# Patient Record
Sex: FEMALE | Race: WHITE | NOT HISPANIC OR LATINO | Employment: UNEMPLOYED | URBAN - METROPOLITAN AREA
[De-identification: names, ages, dates, MRNs, and addresses within clinical notes are randomized per-mention and may not be internally consistent; named-entity substitution may affect disease eponyms.]

---

## 2017-03-10 ENCOUNTER — OFFICE VISIT (OUTPATIENT)
Dept: MEDICAL GROUP | Facility: PHYSICIAN GROUP | Age: 27
End: 2017-03-10
Payer: OTHER GOVERNMENT

## 2017-03-10 VITALS
SYSTOLIC BLOOD PRESSURE: 128 MMHG | DIASTOLIC BLOOD PRESSURE: 80 MMHG | WEIGHT: 198 LBS | TEMPERATURE: 97.6 F | BODY MASS INDEX: 32.99 KG/M2 | HEART RATE: 74 BPM | HEIGHT: 65 IN | OXYGEN SATURATION: 97 %

## 2017-03-10 DIAGNOSIS — E66.9 OBESITY (BMI 30-39.9): ICD-10-CM

## 2017-03-10 DIAGNOSIS — Z01.419 WELL WOMAN EXAM: ICD-10-CM

## 2017-03-10 DIAGNOSIS — Z00.00 WELL ADULT HEALTH CHECK: ICD-10-CM

## 2017-03-10 PROCEDURE — 99395 PREV VISIT EST AGE 18-39: CPT | Performed by: FAMILY MEDICINE

## 2017-03-10 ASSESSMENT — PATIENT HEALTH QUESTIONNAIRE - PHQ9: CLINICAL INTERPRETATION OF PHQ2 SCORE: 0

## 2017-03-10 ASSESSMENT — PAIN SCALES - GENERAL: PAINLEVEL: NO PAIN

## 2017-03-11 NOTE — PROGRESS NOTES
"Subjective:   Diane Duke is a 26 y.o. female here today for annual physical exam    Obesity (BMI 30-39.9)  Ongoing issue. Patient reports that she has started a healthy eating program and is getting regular daily exercise.         Current medicines (including changes today)  Current Outpatient Prescriptions   Medication Sig Dispense Refill   • Prenatal [id number] (PRENATE AM) 1 MG Tab Take 1 Tab by mouth every day. 90 Tab 3   • albuterol 108 (90 BASE) MCG/ACT Aero Soln inhalation aerosol Inhale 2 Puffs by mouth 2 times a day as needed for Shortness of Breath. 2 puffs  BID PRN SOB 1 Inhaler 1     No current facility-administered medications for this visit.     She  has a past medical history of Hemorrhoids (2015); Bronchitis (2013); Hay fever (yearly); and Pneumonia. She also has no past medical history of Headache(784.0), Hypertension, or Diabetes (CMS-[facility]).    ROS   No chest pain, no shortness of breath, no abdominal pain       Objective:     Blood pressure 128/80, pulse 74, temperature 36.4 °C (97.6 °F), height 1.651 m (5' 5\"), weight 89.812 kg (198 lb), SpO2 97 %, not currently breastfeeding. Body mass index is 32.95 kg/(m^2).   Physical Exam:  Alert, oriented in no acute distress.  Eye contact is good, speech goal directed, affect calm  HEENT: conjunctiva non-injected, sclera non-icteric.  Pinna normal. TM pearly gray.   Oral mucous membranes pink and moist with no lesions.  Neck No adenopathy or masses in the neck or supraclavicular regions.  Lungs: clear to auscultation bilaterally with good excursion.  CV: regular rate and rhythm.  Abdomen: soft, nontender, No CVAT  Ext: no edema, color normal, vascularity normal, temperature normal  Neuro: CN 2-12 grossly intact      Assessment and Plan:   The following treatment plan was discussed     1. Well adult health check  CBC WITH DIFFERENTIAL    COMP METABOLIC PANEL    LIPID PROFILE    VITAMIN D,25 HYDROXY    No acute findings on exam; charges been " updated. Patient will be sent for age appropriate screening labs; monitor for results   2. Well woman exam  REFERRAL TO OB/GYN    Stable. Patient needs referral due to insurance.   3. Obesity (BMI 30-39.9)  Patient identified as having weight management issue.  Appropriate orders and counseling given.    Stable. Continue healthy lifestyle. Monitor       Followup: Return in about 1 year (around 3/10/2018) for annual physical exam, Short.

## 2017-03-11 NOTE — ASSESSMENT & PLAN NOTE
Ongoing issue. Patient reports that she has started a healthy eating program and is getting regular daily exercise.

## 2017-03-13 ENCOUNTER — HOSPITAL ENCOUNTER (OUTPATIENT)
Dept: LAB | Facility: MEDICAL CENTER | Age: 27
End: 2017-03-13
Attending: FAMILY MEDICINE
Payer: OTHER GOVERNMENT

## 2017-03-13 DIAGNOSIS — Z00.00 WELL ADULT HEALTH CHECK: ICD-10-CM

## 2017-03-13 LAB
25(OH)D3 SERPL-MCNC: 21 NG/ML (ref 30–100)
ALBUMIN SERPL BCP-MCNC: 4.1 G/DL (ref 3.2–4.9)
ALBUMIN/GLOB SERPL: 1.5 G/DL
ALP SERPL-CCNC: 53 U/L (ref 30–99)
ALT SERPL-CCNC: 18 U/L (ref 2–50)
ANION GAP SERPL CALC-SCNC: 5 MMOL/L (ref 0–11.9)
AST SERPL-CCNC: 16 U/L (ref 12–45)
BASOPHILS # BLD AUTO: 0.04 K/UL (ref 0–0.12)
BASOPHILS NFR BLD AUTO: 0.5 % (ref 0–1.8)
BILIRUB SERPL-MCNC: 0.5 MG/DL (ref 0.1–1.5)
BUN SERPL-MCNC: 11 MG/DL (ref 8–22)
CALCIUM SERPL-MCNC: 9.1 MG/DL (ref 8.5–10.5)
CHLORIDE SERPL-SCNC: 108 MMOL/L (ref 96–112)
CHOLEST SERPL-MCNC: 141 MG/DL (ref 100–199)
CO2 SERPL-SCNC: 25 MMOL/L (ref 20–33)
CREAT SERPL-MCNC: 0.57 MG/DL (ref 0.5–1.4)
EOSINOPHIL # BLD: 0.27 K/UL (ref 0–0.51)
EOSINOPHIL NFR BLD AUTO: 3.1 % (ref 0–6.9)
ERYTHROCYTE [DISTWIDTH] IN BLOOD BY AUTOMATED COUNT: 46 FL (ref 35.9–50)
GLOBULIN SER CALC-MCNC: 2.7 G/DL (ref 1.9–3.5)
GLUCOSE SERPL-MCNC: 97 MG/DL (ref 65–99)
HCT VFR BLD AUTO: 44.1 % (ref 37–47)
HDLC SERPL-MCNC: 44 MG/DL
HGB BLD-MCNC: 14.8 G/DL (ref 12–16)
IMM GRANULOCYTES # BLD AUTO: 0.02 K/UL (ref 0–0.11)
IMM GRANULOCYTES NFR BLD AUTO: 0.2 % (ref 0–0.9)
LDLC SERPL CALC-MCNC: 83 MG/DL
LYMPHOCYTES # BLD: 2.77 K/UL (ref 1–4.8)
LYMPHOCYTES NFR BLD AUTO: 32 % (ref 22–41)
MCH RBC QN AUTO: 29.3 PG (ref 27–33)
MCHC RBC AUTO-ENTMCNC: 33.6 G/DL (ref 33.6–35)
MCV RBC AUTO: 87.3 FL (ref 81.4–97.8)
MONOCYTES # BLD: 0.47 K/UL (ref 0–0.85)
MONOCYTES NFR BLD AUTO: 5.4 % (ref 0–13.4)
NEUTROPHILS # BLD: 5.08 K/UL (ref 2–7.15)
NEUTROPHILS NFR BLD AUTO: 58.8 % (ref 44–72)
NRBC # BLD AUTO: 0 K/UL
NRBC BLD-RTO: 0 /100 WBC
PLATELET # BLD AUTO: 252 K/UL (ref 164–446)
PMV BLD AUTO: 10.6 FL (ref 9–12.9)
POTASSIUM SERPL-SCNC: 4.3 MMOL/L (ref 3.6–5.5)
PROT SERPL-MCNC: 6.8 G/DL (ref 6–8.2)
RBC # BLD AUTO: 5.05 M/UL (ref 4.2–5.4)
SODIUM SERPL-SCNC: 138 MMOL/L (ref 135–145)
TRIGL SERPL-MCNC: 72 MG/DL (ref 0–149)
WBC # BLD AUTO: 8.7 K/UL (ref 4.8–10.8)

## 2017-03-13 PROCEDURE — 80061 LIPID PANEL: CPT

## 2017-03-13 PROCEDURE — 36415 COLL VENOUS BLD VENIPUNCTURE: CPT

## 2017-03-13 PROCEDURE — 85025 COMPLETE CBC W/AUTO DIFF WBC: CPT

## 2017-03-13 PROCEDURE — 80053 COMPREHEN METABOLIC PANEL: CPT

## 2017-03-13 PROCEDURE — 82306 VITAMIN D 25 HYDROXY: CPT

## 2017-03-14 ENCOUNTER — TELEPHONE (OUTPATIENT)
Dept: MEDICAL GROUP | Facility: PHYSICIAN GROUP | Age: 27
End: 2017-03-14

## 2017-03-14 NOTE — TELEPHONE ENCOUNTER
----- Message from Roxane Da Silva D.O. sent at 3/14/2017  7:28 AM PDT -----  Please advise pt that all labs are in a normal/acceptable range except: Vit D.  Recommend take over the counter vit D 2000 IU twice daily.    Roxane Da Silva D.O.

## 2017-03-14 NOTE — Clinical Note
March 14, 2017         Diane Duke  1986 [facility] [address]        Dear Diane:      Below are the results from your recent visit:    Resulted Orders   CBC WITH DIFFERENTIAL   Result Value Ref Range    WBC 8.7 4.8 - 10.8 K/uL    RBC 5.05 4.20 - 5.40 M/uL    Hemoglobin 14.8 12.0 - 16.0 g/dL    Hematocrit 44.1 37.0 - 47.0 %    MCV 87.3 81.4 - 97.8 fL    MCH 29.3 27.0 - 33.0 pg    MCHC 33.6 33.6 - 35.0 g/dL    RDW 46.0 35.9 - 50.0 fL    Platelet Count 252 164 - 446 K/uL    MPV 10.6 9.0 - 12.9 fL    Neutrophils-Polys 58.80 44.00 - 72.00 %    Lymphocytes 32.00 22.00 - 41.00 %    Monocytes 5.40 0.00 - 13.40 %    Eosinophils 3.10 0.00 - 6.90 %    Basophils 0.50 0.00 - 1.80 %    Immature Granulocytes 0.20 0.00 - 0.90 %    Nucleated RBC 0.00 /100 WBC    Neutrophils (Absolute) 5.08 2.00 - 7.15 K/uL      Comment:      Includes immature neutrophils, if present.    Lymphs (Absolute) 2.77 1.00 - 4.80 K/uL    Monos (Absolute) 0.47 0.00 - 0.85 K/uL    Eos (Absolute) 0.27 0.00 - 0.51 K/uL    Baso (Absolute) 0.04 0.00 - 0.12 K/uL    Immature Granulocytes (abs) 0.02 0.00 - 0.11 K/uL    NRBC (Absolute) 0.00 K/uL    Narrative    Request patient fasting?->Yes   COMP METABOLIC PANEL   Result Value Ref Range    Sodium 138 135 - 145 mmol/L    Potassium 4.3 3.6 - 5.5 mmol/L    Chloride 108 96 - 112 mmol/L    Co2 25 20 - 33 mmol/L    Anion Gap 5.0 0.0 - 11.9    Glucose 97 65 - 99 mg/dL    Bun 11 8 - 22 mg/dL    Creatinine 0.57 0.50 - 1.40 mg/dL    Calcium 9.1 8.5 - 10.5 mg/dL    AST(SGOT) 16 12 - 45 U/L    ALT(SGPT) 18 2 - 50 U/L    Alkaline Phosphatase 53 30 - 99 U/L    Total Bilirubin 0.5 0.1 - 1.5 mg/dL    Albumin 4.1 3.2 - 4.9 g/dL    Total Protein 6.8 6.0 - 8.2 g/dL    Globulin 2.7 1.9 - 3.5 g/dL    A-G Ratio 1.5 g/dL    Narrative    Request patient fasting?->Yes   LIPID PROFILE   Result Value Ref Range    Cholesterol,Tot 141 100 - 199 mg/dL    Triglycerides 72 0 - 149 mg/dL    HDL 44 >=40 mg/dL    LDL 83 <100 mg/dL     Narrative    Request patient fasting?->Yes   VITAMIN D,25 HYDROXY   Result Value Ref Range    25-Hydroxy   Vitamin D 25 21 (L) 30 - 100 ng/mL      Comment:      Adult Ranges:   <20 ng/mL - Deficiency  20-29 ng/mL - Insufficiency   ng/mL - Sufficiency  The [employer] Centaur Vitamin D Assay is standardized to the  [facility] reference measurement procedures, a  reference method for the Vitamin D Standardization Program  (VDSP).  The VDSP aligns patient results among 25 (OH)  Vitamin D methods.      Narrative    Request patient fasting?->Yes   ESTIMATED GFR   Result Value Ref Range    GFR If African American >60 >60 mL/min/1.73 m 2    GFR If Non African American >60 >60 mL/min/1.73 m 2    Narrative    Request patient fasting?->Yes     The test results show that all labs are in a normal/acceptable range except: Vit D.   Recommend take over the counter vit D 2000 IU twice daily. .    If you have any questions or concerns, please don't hesitate to call.        Sincerely,      Roxane Da Silva D.O.    Electronically Signed

## 2017-07-27 ENCOUNTER — TELEPHONE (OUTPATIENT)
Dept: OBGYN | Facility: CLINIC | Age: 27
End: 2017-07-27

## 2017-07-27 NOTE — TELEPHONE ENCOUNTER
Patient called is 10 wks pregnant and is have in light spotting. Wants to know is it is normal. States not cramping

## 2017-07-27 NOTE — TELEPHONE ENCOUNTER
Pt called stating she was having some pinkish spotting only one time when she wiped. States she had intercourse last night. No cramping or bright red bleeding. Patient has appointment for DUB in August. Explained to patient some spotting after intercourse may occur and can be normal. To make sure no bright red bleeding but if it does happen to call back. Pt had no further questions.

## 2017-07-28 ENCOUNTER — TELEPHONE (OUTPATIENT)
Dept: OBGYN | Facility: CLINIC | Age: 27
End: 2017-07-28

## 2017-07-28 ENCOUNTER — GYNECOLOGY VISIT (OUTPATIENT)
Dept: OBGYN | Facility: CLINIC | Age: 27
End: 2017-07-28
Payer: OTHER GOVERNMENT

## 2017-07-28 ENCOUNTER — APPOINTMENT (OUTPATIENT)
Dept: RADIOLOGY | Facility: MEDICAL CENTER | Age: 27
End: 2017-07-28
Attending: EMERGENCY MEDICINE
Payer: OTHER GOVERNMENT

## 2017-07-28 ENCOUNTER — HOSPITAL ENCOUNTER (EMERGENCY)
Facility: MEDICAL CENTER | Age: 27
End: 2017-07-28
Attending: EMERGENCY MEDICINE
Payer: OTHER GOVERNMENT

## 2017-07-28 VITALS
WEIGHT: 210.32 LBS | OXYGEN SATURATION: 98 % | SYSTOLIC BLOOD PRESSURE: 140 MMHG | TEMPERATURE: 98.4 F | DIASTOLIC BLOOD PRESSURE: 75 MMHG | BODY MASS INDEX: 33.01 KG/M2 | HEART RATE: 75 BPM | RESPIRATION RATE: 16 BRPM | HEIGHT: 67 IN

## 2017-07-28 VITALS
SYSTOLIC BLOOD PRESSURE: 116 MMHG | BODY MASS INDEX: 33.12 KG/M2 | HEIGHT: 67 IN | DIASTOLIC BLOOD PRESSURE: 72 MMHG | WEIGHT: 211 LBS

## 2017-07-28 DIAGNOSIS — O02.1 MISSED ABORTION: ICD-10-CM

## 2017-07-28 DIAGNOSIS — O03.9 MISCARRIAGE: ICD-10-CM

## 2017-07-28 LAB
ALBUMIN SERPL BCP-MCNC: 3.7 G/DL (ref 3.2–4.9)
ALBUMIN/GLOB SERPL: 1.2 G/DL
ALP SERPL-CCNC: 56 U/L (ref 30–99)
ALT SERPL-CCNC: 21 U/L (ref 2–50)
ANION GAP SERPL CALC-SCNC: 10 MMOL/L (ref 0–11.9)
APPEARANCE UR: CLEAR
AST SERPL-CCNC: 18 U/L (ref 12–45)
B-HCG SERPL-ACNC: 7623 MIU/ML (ref 0–10)
BACTERIA GENITAL QL WET PREP: NORMAL
BASOPHILS # BLD AUTO: 0.6 % (ref 0–1.8)
BASOPHILS # BLD: 0.04 K/UL (ref 0–0.12)
BILIRUB SERPL-MCNC: 0.9 MG/DL (ref 0.1–1.5)
BILIRUB UR QL STRIP.AUTO: NEGATIVE
BUN SERPL-MCNC: 5 MG/DL (ref 8–22)
C TRACH DNA SPEC QL NAA+PROBE: NEGATIVE
CALCIUM SERPL-MCNC: 9.1 MG/DL (ref 8.4–10.2)
CHLORIDE SERPL-SCNC: 103 MMOL/L (ref 96–112)
CO2 SERPL-SCNC: 24 MMOL/L (ref 20–33)
COLOR UR: ABNORMAL
CREAT SERPL-MCNC: 0.69 MG/DL (ref 0.5–1.4)
CULTURE IF INDICATED INDCX: NO UA CULTURE
EOSINOPHIL # BLD AUTO: 0.18 K/UL (ref 0–0.51)
EOSINOPHIL NFR BLD: 2.5 % (ref 0–6.9)
EPI CELLS #/AREA URNS HPF: NORMAL /HPF
ERYTHROCYTE [DISTWIDTH] IN BLOOD BY AUTOMATED COUNT: 44.3 FL (ref 35.9–50)
GFR SERPL CREATININE-BSD FRML MDRD: >60 ML/MIN/1.73 M 2
GLOBULIN SER CALC-MCNC: 3.2 G/DL (ref 1.9–3.5)
GLUCOSE SERPL-MCNC: 95 MG/DL (ref 65–99)
GLUCOSE UR STRIP.AUTO-MCNC: NEGATIVE MG/DL
HCT VFR BLD AUTO: 45 % (ref 37–47)
HGB BLD-MCNC: 15.8 G/DL (ref 12–16)
IMM GRANULOCYTES # BLD AUTO: 0.02 K/UL (ref 0–0.11)
IMM GRANULOCYTES NFR BLD AUTO: 0.3 % (ref 0–0.9)
KETONES UR STRIP.AUTO-MCNC: NEGATIVE MG/DL
LEUKOCYTE ESTERASE UR QL STRIP.AUTO: NEGATIVE
LIPASE SERPL-CCNC: 22 U/L (ref 7–58)
LYMPHOCYTES # BLD AUTO: 2.94 K/UL (ref 1–4.8)
LYMPHOCYTES NFR BLD: 41.5 % (ref 22–41)
MCH RBC QN AUTO: 30.4 PG (ref 27–33)
MCHC RBC AUTO-ENTMCNC: 35.1 G/DL (ref 33.6–35)
MCV RBC AUTO: 86.5 FL (ref 81.4–97.8)
MICRO URNS: ABNORMAL
MONOCYTES # BLD AUTO: 0.35 K/UL (ref 0–0.85)
MONOCYTES NFR BLD AUTO: 4.9 % (ref 0–13.4)
N GONORRHOEA DNA SPEC QL NAA+PROBE: NEGATIVE
NEUTROPHILS # BLD AUTO: 3.56 K/UL (ref 2–7.15)
NEUTROPHILS NFR BLD: 50.2 % (ref 44–72)
NITRITE UR QL STRIP.AUTO: NEGATIVE
NRBC # BLD AUTO: 0 K/UL
NRBC BLD AUTO-RTO: 0 /100 WBC
NUMBER OF RH DOSES IND 8505RD: NORMAL
PH UR STRIP.AUTO: 6 [PH]
PLATELET # BLD AUTO: 259 K/UL (ref 164–446)
PMV BLD AUTO: 9.5 FL (ref 9–12.9)
POTASSIUM SERPL-SCNC: 3.6 MMOL/L (ref 3.6–5.5)
PROT SERPL-MCNC: 6.9 G/DL (ref 6–8.2)
PROT UR QL STRIP: NEGATIVE MG/DL
RBC # BLD AUTO: 5.2 M/UL (ref 4.2–5.4)
RBC # URNS HPF: NORMAL /HPF
RBC UR QL AUTO: ABNORMAL
RH BLD: NORMAL
SIGNIFICANT IND 70042: NORMAL
SITE SITE: NORMAL
SODIUM SERPL-SCNC: 137 MMOL/L (ref 135–145)
SOURCE SOURCE: NORMAL
SP GR UR STRIP.AUTO: <=1.005
SPECIMEN SOURCE: NORMAL
WBC # BLD AUTO: 7.1 K/UL (ref 4.8–10.8)
WBC #/AREA URNS HPF: NORMAL /HPF

## 2017-07-28 PROCEDURE — 81001 URINALYSIS AUTO W/SCOPE: CPT

## 2017-07-28 PROCEDURE — 86901 BLOOD TYPING SEROLOGIC RH(D): CPT

## 2017-07-28 PROCEDURE — 85025 COMPLETE CBC W/AUTO DIFF WBC: CPT

## 2017-07-28 PROCEDURE — 84702 CHORIONIC GONADOTROPIN TEST: CPT

## 2017-07-28 PROCEDURE — 99284 EMERGENCY DEPT VISIT MOD MDM: CPT

## 2017-07-28 PROCEDURE — 36415 COLL VENOUS BLD VENIPUNCTURE: CPT

## 2017-07-28 PROCEDURE — 76817 TRANSVAGINAL US OBSTETRIC: CPT | Performed by: OBSTETRICS & GYNECOLOGY

## 2017-07-28 PROCEDURE — 700105 HCHG RX REV CODE 258: Performed by: EMERGENCY MEDICINE

## 2017-07-28 PROCEDURE — 80053 COMPREHEN METABOLIC PANEL: CPT

## 2017-07-28 PROCEDURE — 99213 OFFICE O/P EST LOW 20 MIN: CPT | Performed by: OBSTETRICS & GYNECOLOGY

## 2017-07-28 PROCEDURE — 87591 N.GONORRHOEAE DNA AMP PROB: CPT

## 2017-07-28 PROCEDURE — 87491 CHLMYD TRACH DNA AMP PROBE: CPT

## 2017-07-28 PROCEDURE — 76817 TRANSVAGINAL US OBSTETRIC: CPT

## 2017-07-28 PROCEDURE — 83690 ASSAY OF LIPASE: CPT

## 2017-07-28 RX ORDER — ONDANSETRON 4 MG/1
4 TABLET, ORALLY DISINTEGRATING ORAL EVERY 8 HOURS PRN
Qty: 12 TAB | Refills: 0 | Status: SHIPPED | OUTPATIENT
Start: 2017-07-28 | End: 2017-07-28

## 2017-07-28 RX ORDER — ONDANSETRON 4 MG/1
4 TABLET, ORALLY DISINTEGRATING ORAL EVERY 8 HOURS PRN
Qty: 12 TAB | Refills: 0 | Status: SHIPPED | OUTPATIENT
Start: 2017-07-28 | End: 2017-09-20

## 2017-07-28 RX ORDER — IBUPROFEN 600 MG/1
600 TABLET ORAL
Qty: 20 TAB | Refills: 0 | Status: SHIPPED | OUTPATIENT
Start: 2017-07-28 | End: 2017-09-20

## 2017-07-28 RX ORDER — SODIUM CHLORIDE 9 MG/ML
1000 INJECTION, SOLUTION INTRAVENOUS ONCE
Status: COMPLETED | OUTPATIENT
Start: 2017-07-28 | End: 2017-07-28

## 2017-07-28 RX ADMIN — SODIUM CHLORIDE 1000 ML: 9 INJECTION, SOLUTION INTRAVENOUS at 06:20

## 2017-07-28 NOTE — DISCHARGE INSTRUCTIONS
"Miscarriage    Touch base later today with Dr Matthew and let them know what is going on.  I want you seen this upcoming week.  Return to [facility] for heavy bleeding/symptoms of blood loss, increasing pain, fever, or any turn for the worse.  Ibuprofen will help with cramping, may use Zofran for nausea.    A miscarriage is the sudden loss of an unborn baby (fetus) before the 20th week of pregnancy. Most miscarriages happen in the first 3 months of pregnancy. Sometimes, it happens before a woman even knows she is pregnant. A miscarriage is also called a \"spontaneous miscarriage\" or \"early pregnancy loss.\" Having a miscarriage can be an emotional experience. Talk with your caregiver about any questions you may have about miscarrying, the grieving process, and your future pregnancy plans.  CAUSES   · Problems with the fetal chromosomes that make it impossible for the baby to develop normally. Problems with the baby's genes or chromosomes are most often the result of errors that occur, by chance, as the embryo divides and grows. The problems are not inherited from the parents.  · Infection of the cervix or uterus.    · Hormone problems.    · Problems with the cervix, such as having an incompetent cervix. This is when the tissue in the cervix is not strong enough to hold the pregnancy.    · Problems with the uterus, such as an abnormally shaped uterus, uterine fibroids, or congenital abnormalities.    · Certain medical conditions.    · Smoking, drinking alcohol, or taking illegal drugs.    · Trauma.    Often, the cause of a miscarriage is unknown.   SYMPTOMS   · Vaginal bleeding or spotting, with or without cramps or pain.  · Pain or cramping in the abdomen or lower back.  · Passing fluid, tissue, or blood clots from the vagina.  DIAGNOSIS   Your caregiver will perform a physical exam. You may also have an ultrasound to confirm the miscarriage. Blood or urine tests may also be ordered.  TREATMENT   · Sometimes, treatment is " not necessary if you naturally pass all the fetal tissue that was in the uterus. If some of the fetus or placenta remains in the body (incomplete miscarriage), tissue left behind may become infected and must be removed. Usually, a dilation and curettage (D and C) procedure is performed. During a D and C procedure, the cervix is widened (dilated) and any remaining fetal or placental tissue is gently removed from the uterus.  · Antibiotic medicines are prescribed if there is an infection. Other medicines may be given to reduce the size of the uterus (contract) if there is a lot of bleeding.  · If you have Rh negative blood and your baby was Rh positive, you will need a Rh immunoglobulin shot. This shot will protect any future baby from having Rh blood problems in future pregnancies.  HOME CARE INSTRUCTIONS   · Your caregiver may order bed rest or may allow you to continue light activity. Resume activity as directed by your caregiver.  · Have someone help with home and family responsibilities during this time.    · Keep track of the number of sanitary pads you use each day and how soaked (saturated) they are. Write down this information.    · Do not use tampons. Do not douche or have sexual intercourse until approved by your caregiver.    · Only take over-the-counter or prescription medicines for pain or discomfort as directed by your caregiver.    · Do not take aspirin. Aspirin can cause bleeding.    · Keep all follow-up appointments with your caregiver.    · If you or your partner have problems with grieving, talk to your caregiver or seek counseling to help cope with the pregnancy loss. Allow enough time to grieve before trying to get pregnant again.    SEEK IMMEDIATE MEDICAL CARE IF:   · You have severe cramps or pain in your back or abdomen.  · You have a fever.  · You pass large blood clots (walnut-sized or larger) or tissue from your vagina. Save any tissue for your caregiver to inspect.    · Your bleeding  increases.    · You have a thick, bad-smelling vaginal discharge.  · You become lightheaded, weak, or you faint.    · You have chills.    MAKE SURE YOU:  · Understand these instructions.  · Will watch your condition.  · Will get help right away if you are not doing well or get worse.     This information is not intended to replace advice given to you by your health care provider. Make sure you discuss any questions you have with your health care provider.     Document Released: 06/13/2002 Document Revised: 04/14/2014 Document Reviewed: 02/05/2013  [employer] Interactive Patient Education ©2016 [employer]

## 2017-07-28 NOTE — TELEPHONE ENCOUNTER
Patient left voice mail stating that she went to urgent care and she had a miss carriage and needs to get in. Please call patient

## 2017-07-28 NOTE — ED PROVIDER NOTES
ED Provider Note    CHIEF COMPLAINT  Chief Complaint   Patient presents with   • Vaginal Bleeding     started bleeding yesterday at noon; per pt 10 weeks pregnant-used one pad this a.m.   • Abdominal Cramping       HPI  Diane Duke is a 27 y.o. female who presents complaining of abdominal pain and vaginal bleeding. The patient is a  at 10 weeks by dates. Yesterday she started having some spotting. This morning she woke sleep with heavier blood and sensation similar to when she starting her menstrual cycle. She has some cramping in the lower abdomen. It feels like menstrual cramps. Nothing makes it better, nothing makes it worse. Does not radiate. She otherwise has felt fine. She's had no nausea or vomiting. She's had no change in bowel or bladder. No discharge previously. No fever or chills. There is no other complaint. She called her gynecologist, Dr. Matthew, but was unable to be seen for 3-1/2 weeks, therefore presents here for guidance.    PAST MEDICAL HISTORY  Past Medical History   Diagnosis Date   • Hemorrhoids    • Bronchitis    • Hay fever yearly   • Pneumonia        FAMILY HISTORY  Family History   Problem Relation Age of Onset   • Hypertension Mother    • Diabetes Mother      type 2   • Other Paternal Uncle      obesity   • Hypertension Maternal Grandmother    • Depression Maternal Grandmother    • Diabetes Maternal Grandmother    • Cancer Maternal Grandfather      colon cancer & eye cancer   • Asthma Paternal Grandmother    • Anemia Other    • Hyperlipidemia Father        SOCIAL HISTORY  Social History   Substance Use Topics   • Smoking status: Never Smoker    • Smokeless tobacco: Never Used   • Alcohol Use: No         SURGICAL HISTORY  Past Surgical History   Procedure Laterality Date   • Appendectomy     • Primary c section         CURRENT MEDICATIONS  Home Medications     Reviewed by Kenia Tierney R.N. (Registered Nurse) on 17 at 0543  Med List Status: Complete     "Medication Last Dose Status    Prenatal [id number] (PRENATE AM) 1 MG Tab 7/27/2017 Active                I have reviewed the nurses notes and/or the list brought with the patient.    ALLERGIES  Allergies   Allergen Reactions   • Sulfa Drugs        REVIEW OF SYSTEMS  See HPI for further details. Review of systems as above, otherwise all other systems are negative.     PHYSICAL EXAM  VITAL SIGNS: /76 mmHg  Pulse 72  Temp(Src) 36.9 °C (98.4 °F)  Resp 16  Ht 1.702 m (5' 7.01\")  Wt 95.4 kg (210 lb 5.1 oz)  BMI 32.93 kg/m2  SpO2 98%  LMP 05/19/2017  Constitutional: Well appearing patient in no acute distress.  Not toxic, nor ill in appearance.  HENT: Mucus membranes moist.  Oropharynx is clear.  Eyes: Pupils equally round.  No scleral icterus.   Neck: Full nontender range of motion.  Cardiovascular: Regular heart rate and rhythm.  No murmurs, rubs, nor gallop appreciated.   Thorax & Lungs: Chest is nontender.  Lungs are clear to auscultation with good air movement bilaterally.  No wheeze, rhonchi, nor rales.   Abdomen: Soft, with no tenderness, rebound nor guarding.  No mass, pulsatile mass, nor hepatosplenomegaly appreciated.  Pelvic: Nurse chaperone. Normal external genitalia. There is some blood in the vault. The cervix is closed, nontender. There is no adnexal mass or tenderness appreciated.  Skin: No purpura nor petechia noted.  Extremities/Musculoskeletal: No sign of trauma.    Neurologic: Alert & oriented.  Strength and sensation is intact all around.  Gait is normal.  Psychiatric: Normal affect appropriate for the clinical situation.      LABS  Labs Reviewed   CBC WITH DIFFERENTIAL - Abnormal; Notable for the following:     MCHC 35.1 (*)     Lymphocytes 41.50 (*)     All other components within normal limits   URINALYSIS,CULTURE IF INDICATED - Abnormal; Notable for the following:     Occult Blood Small (*)     All other components within normal limits   HCG QUANTITATIVE - Abnormal; Notable " for the following:     Bhcg 7623.0 (*)     All other components within normal limits   COMP METABOLIC PANEL - Abnormal; Notable for the following:     Bun 5 (*)     All other components within normal limits   RH TYPE FOR RHOGAM FROM E.D.    Narrative:     Print Consent?->No   LIPASE   WET PREP   CHLAMYDIA/GC PCR URINE OR SWAB   ESTIMATED GFR   URINE MICROSCOPIC (W/UA)         RADIOLOGY/PROCEDURES  I have reviewed the patient's film interpretations myself, and they are read out by the radiologist as:   US-OB PELVIS TRANSVAGINAL   Final Result      Single intrauterine gestation is failed,  in progress, no cardiac activity        .    MEDICAL RECORD  I have reviewed patient's medical record and pertinent results are listed above.    COURSE & MEDICAL DECISION MAKING  I have reviewed any medical record information, laboratory studies and radiographic results as noted above.  This patient presents with vaginal bleeding setting of pregnancy. Her hCG is low relative to where it should be. Further, ultrasound measures about a 7 week fetus, abnormal compared to her dates, and there is no evidence of cardiac activity with what appears to be a  in progress. At this point, the patient is hemodynamically normal, is comfortable. I think that she is a candidate for conservative management. I did discuss the patient's case with Dr. Wilkes on call for her OB, Dr. Matthew, who agrees. I've asked her to touch base with their office later today when it opens, to let them know what is going on, as well as making sure she is seen early this upcoming week. We talked about specific return precautions, such as increased bleeding or symptoms of blood loss, pain not controlled by medicine, fever. She should return to the ER for any of these symptoms or any turn for the worse. We discussed return to [facility] as we have no gynecology on-call this hospital, and Dr. Matthew, I am told, does not have privileges here. I will write her for  ibuprofen for pain; understanding this might give her some increased chance of bleeding but I think will help quite a bit with the cramping discomfort. Also for Zofran should she need it. Instructions on miscarriage.    FINAL IMPRESSION  1. Miscarriage           This dictation was created using voice recognition software.    Electronically signed by: Danyel Johnson, 7/28/2017 6:05 AM

## 2017-07-28 NOTE — MR AVS SNAPSHOT
"        Diane Duke   2017 2:30 PM   Gynecology Visit   MRN: [id number]    Department:  [facility]   Dept Phone:  [phone]    Description:  Female : 1990   Provider:  Ezra Matthew M.D.           Reason for Visit     Other SAB      Allergies as of 2017     Allergen Noted Reactions    Sulfa Drugs 2015         You were diagnosed with     Missed    [632.ICD-9-CM]         Vital Signs     Blood Pressure Height Weight Body Mass Index Last Menstrual Period Breastfeeding?    116/72 mmHg 1.702 m (5' 7\") 95.709 kg (211 lb) 33.04 kg/m2 2017 Unknown    Smoking Status                   Never Smoker            Basic Information     Date Of Birth Sex Race Ethnicity Preferred Language    1990 Female White, White Non- English      Your appointments     Aug 22, 2017 11:00 AM   GYN Visit with Ezra Matthew M.D.   [facility] [address], [address]  [facility] [address] [phone]            Mar 09, 2018  4:00 PM   Established Patient with Roxane Da Silva D.O.   [facility]    [address] [place] [address] [phone]           You will be receiving a confirmation call a few days before your appointment from our automated call confirmation system.              Problem List              ICD-10-CM Priority Class Noted - Resolved    Irregular periods/menstrual cycles N92.6   3/4/2016 - Present    Encounter for preconception consultation Z31.69   2016 - Present    Obesity (BMI 30-39.9) E66.9   3/10/2017 - Present      Health Maintenance        Date Due Completion Dates    IMM DTaP/Tdap/Td Vaccine (1 - Tdap) 3/20/2008 ---    IMM INFLUENZA (1) 2017 ---    PAP SMEAR 2019, 2015            Current Immunizations     No immunizations on file.      Below and/or attached are the medications your provider expects you to take. Review all of your home medications and " newly ordered medications with your provider and/or pharmacist. Follow medication instructions as directed by your provider and/or pharmacist. Please keep your medication list with you and share with your provider. Update the information when medications are discontinued, doses are changed, or new medications (including over-the-counter products) are added; and carry medication information at all times in the event of emergency situations     Allergies:  SULFA DRUGS - (reactions not documented)               Medications  Valid as of: July 28, 2017 -  2:57 PM    Generic Name Brand Name Tablet Size Instructions for use    Ibuprofen (Tab) MOTRIN 600 MG Take 1 Tab by mouth 3 times a day, with meals. As needed for pain        Ondansetron (TABLET DISPERSIBLE) ZOFRAN ODT 4 MG Take 1 Tab by mouth every 8 hours as needed for Nausea/Vomiting.        Prenatal Hp-E4-[id number]-Igzcwt (Tab) PRENATE AM 1 MG Take 1 Tab by mouth every day.        .                 Medicines prescribed today were sent to:     [facility] [address] [place] [address] [id number] [place] [address] & [place] [id number] [facility] [address]    Phone: [phone] Fax: [phone]    Open 24 Hours?: No    [facility] - [address] [address]    Phone: [phone] Fax: [phone]    Open 24 Hours?: No      Medication refill instructions:       If your prescription bottle indicates you have medication refills left, it is not necessary to call your provider’s office. Please contact your pharmacy and they will refill your medication.    If your prescription bottle indicates you do not have any refills left, you may request refills at any time through one of the following ways: The online [employer] system (except Urgent Care), by calling your provider’s office, or by asking your pharmacy to contact your provider’s office with a refill request. Medication  refills are processed only during regular business hours and may not be available until the next business day. Your provider may request additional information or to have a follow-up visit with you prior to refilling your medication.   *Please Note: Medication refills are assigned a new Rx number when refilled electronically. Your pharmacy may indicate that no refills were authorized even though a new prescription for the same medication is available at the pharmacy. Please request the medicine by name with the pharmacy before contacting your provider for a refill.        Your To Do List     Future Labs/Procedures Complete By Expires    HCG QUANTITATIVE  As directed 7/28/2018    HCG QUANTITATIVE  As directed 7/28/2018         [employer] Access Code: Activation code not generated  Current seedtag Status: Active

## 2017-07-28 NOTE — ED NOTES
"Pt here with c/o    Chief Complaint   Patient presents with   • Vaginal Bleeding     started bleeding yesterday at noon; per pt 10 weeks pregnant-used one pad this a.m.   • Abdominal Cramping       /76 mmHg  Pulse 72  Temp(Src) 36.9 °C (98.4 °F)  Resp 16  Ht 1.702 m (5' 7.01\")  Wt 95.4 kg (210 lb 5.1 oz)  BMI 32.93 kg/m2  SpO2 98%    "

## 2017-07-28 NOTE — ED AVS SNAPSHOT
7/28/2017    Diane Duke  [address]  [place] [address]    Dear Diane:    [facility] wants to ensure your discharge home is safe and you or your loved ones have had all of your questions answered regarding your care after you leave the hospital.    Below is a list of resources and contact information should you have any questions regarding your hospital stay, follow-up instructions, or active medical symptoms.    Questions or Concerns Regarding… Contact   Medical Questions Related to Your Discharge  (7 days a week, 8am-5pm) Contact a Nurse Care Coordinator   [phone]   Medical Questions Not Related to Your Discharge  (24 hours a day / 7 days a week)  Contact the Nurse Health Line   [phone]    Medications or Discharge Instructions Refer to your discharge packet   or contact your [facility] Primary Care Provider   [phone]   Follow-up Appointment(s) Schedule your appointment via Goojet   or contact Scheduling [phone]   Billing Review your statement via Goojet  or contact Billing [phone]   Medical Records Review your records via Goojet   or contact Medical Records [phone]     You may receive a telephone call within two days of discharge. This call is to make certain you understand your discharge instructions and have the opportunity to have any questions answered. You can also easily access your medical information, test results and upcoming appointments via the Goojet free online health management tool. You can learn more and sign up at [employer]okki/Goojet. For assistance setting up your Goojet account, please call [phone].    Once again, we want to ensure your discharge home is safe and that you have a clear understanding of any next steps in your care. If you have any questions or concerns, please do not hesitate to contact us, we are here for you. Thank you for choosing [facility] for your healthcare needs.    Sincerely,    Your [facility] Healthcare Team

## 2017-07-28 NOTE — ED AVS SNAPSHOT
Home Care Instructions                                                                                                                Diane Duke   MRN: [id number]    Department:  [facility], Emergency Dept   Date of Visit:  7/28/2017            [facility], Emergency Dept    [address]    [place] [address]    Phone:  [phone]      You were seen by     Danyel Johnson M.D.      Your Diagnosis Was     Miscarriage     O03.9       These are the medications you received during your hospitalization from 07/28/2017 0527 to 07/28/2017 0739     Date/Time Order Dose Route Action    07/28/2017 0620 NS infusion 1,000 mL 1,000 mL Intravenous New Bag      Follow-up Information     1. Follow up with Ezra Matthew M.D..    Specialty:  OB/Gyn    Contact information    [address]  [place] [address] [phone]        Medication Information     Review all of your home medications and newly ordered medications with your primary doctor and/or pharmacist as soon as possible. Follow medication instructions as directed by your doctor and/or pharmacist.     Please keep your complete medication list with you and share with your physician. Update the information when medications are discontinued, doses are changed, or new medications (including over-the-counter products) are added; and carry medication information at all times in the event of emergency situations.               Medication List      START taking these medications        Instructions    Morning Afternoon Evening Bedtime    ibuprofen 600 MG Tabs   Commonly known as:  MOTRIN        Take 1 Tab by mouth 3 times a day, with meals. As needed for pain   Dose:  600 mg                        ondansetron 4 MG Tbdp   Commonly known as:  ZOFRAN ODT        Take 1 Tab by mouth every 8 hours as needed for Nausea/Vomiting.   Dose:  4 mg                          ASK your doctor about these medications        "Instructions    Morning Afternoon Evening Bedtime    PRENATE AM 1 MG Tabs        Take 1 Tab by mouth every day.   Dose:  1 Tab                             Where to Get Your Medications      You can get these medications from any pharmacy     Bring a paper prescription for each of these medications    - ibuprofen 600 MG Tabs  - ondansetron 4 MG Tbdp            Procedures and tests performed during your visit     CBC WITH DIFFERENTIAL    CHLAMYDIA & GC BY PCR    COMP METABOLIC PANEL    ESTIMATED GFR    HCG QUANTITATIVE SERUM    LIPASE    RH TYPE FOR RHOGAM FROM E.D.    URINALYSIS CULTURE, IF INDICATED    URINE MICROSCOPIC (W/UA)    US-OB PELVIS TRANSVAGINAL    WET PREP        Discharge Instructions       Miscarriage    Touch base later today with Dr Matthew and let them know what is going on.  I want you seen this upcoming week.  Return to [facility] for heavy bleeding/symptoms of blood loss, increasing pain, fever, or any turn for the worse.  Ibuprofen will help with cramping, may use Zofran for nausea.    A miscarriage is the sudden loss of an unborn baby (fetus) before the 20th week of pregnancy. Most miscarriages happen in the first 3 months of pregnancy. Sometimes, it happens before a woman even knows she is pregnant. A miscarriage is also called a \"spontaneous miscarriage\" or \"early pregnancy loss.\" Having a miscarriage can be an emotional experience. Talk with your caregiver about any questions you may have about miscarrying, the grieving process, and your future pregnancy plans.  CAUSES   · Problems with the fetal chromosomes that make it impossible for the baby to develop normally. Problems with the baby's genes or chromosomes are most often the result of errors that occur, by chance, as the embryo divides and grows. The problems are not inherited from the parents.  · Infection of the cervix or uterus.    · Hormone problems.    · Problems with the cervix, such as having an incompetent cervix. This is when the tissue " in the cervix is not strong enough to hold the pregnancy.    · Problems with the uterus, such as an abnormally shaped uterus, uterine fibroids, or congenital abnormalities.    · Certain medical conditions.    · Smoking, drinking alcohol, or taking illegal drugs.    · Trauma.    Often, the cause of a miscarriage is unknown.   SYMPTOMS   · Vaginal bleeding or spotting, with or without cramps or pain.  · Pain or cramping in the abdomen or lower back.  · Passing fluid, tissue, or blood clots from the vagina.  DIAGNOSIS   Your caregiver will perform a physical exam. You may also have an ultrasound to confirm the miscarriage. Blood or urine tests may also be ordered.  TREATMENT   · Sometimes, treatment is not necessary if you naturally pass all the fetal tissue that was in the uterus. If some of the fetus or placenta remains in the body (incomplete miscarriage), tissue left behind may become infected and must be removed. Usually, a dilation and curettage (D and C) procedure is performed. During a D and C procedure, the cervix is widened (dilated) and any remaining fetal or placental tissue is gently removed from the uterus.  · Antibiotic medicines are prescribed if there is an infection. Other medicines may be given to reduce the size of the uterus (contract) if there is a lot of bleeding.  · If you have Rh negative blood and your baby was Rh positive, you will need a Rh immunoglobulin shot. This shot will protect any future baby from having Rh blood problems in future pregnancies.  HOME CARE INSTRUCTIONS   · Your caregiver may order bed rest or may allow you to continue light activity. Resume activity as directed by your caregiver.  · Have someone help with home and family responsibilities during this time.    · Keep track of the number of sanitary pads you use each day and how soaked (saturated) they are. Write down this information.    · Do not use tampons. Do not douche or have sexual intercourse until approved by  your caregiver.    · Only take over-the-counter or prescription medicines for pain or discomfort as directed by your caregiver.    · Do not take aspirin. Aspirin can cause bleeding.    · Keep all follow-up appointments with your caregiver.    · If you or your partner have problems with grieving, talk to your caregiver or seek counseling to help cope with the pregnancy loss. Allow enough time to grieve before trying to get pregnant again.    SEEK IMMEDIATE MEDICAL CARE IF:   · You have severe cramps or pain in your back or abdomen.  · You have a fever.  · You pass large blood clots (walnut-sized or larger) or tissue from your vagina. Save any tissue for your caregiver to inspect.    · Your bleeding increases.    · You have a thick, bad-smelling vaginal discharge.  · You become lightheaded, weak, or you faint.    · You have chills.    MAKE SURE YOU:  · Understand these instructions.  · Will watch your condition.  · Will get help right away if you are not doing well or get worse.     This information is not intended to replace advice given to you by your health care provider. Make sure you discuss any questions you have with your health care provider.     Document Released: 06/13/2002 Document Revised: 04/14/2014 Document Reviewed: 02/05/2013  Bucky [employer] Interactive Patient Education ©2016 [employer]            Patient Information     Patient Information    Following emergency treatment: all patient requiring follow-up care must return either to a private physician or a clinic if your condition worsens before you are able to obtain further medical attention, please return to the emergency room.     Billing Information    At [facility], we work to make the billing process streamlined for our patients.  Our Representatives are here to answer any questions you may have regarding your hospital bill.  If you have insurance coverage and have supplied your insurance information to us, we will submit a claim to your  insurer on your behalf.  Should you have any questions regarding your bill, we can be reached online or by phone as follows:  Online: You are able pay your bills online or live chat with our representatives about any billing questions you may have. We are here to help Monday - Friday from 8:00am to 7:30pm and 9:00am - 12:00pm on Saturdays.  Please visit [email or web address]  for more information.   Phone:  [phone] or [phone]    Please note that your emergency physician, surgeon, pathologist, radiologist, anesthesiologist, and other specialists are not employed by [facility] and will therefore bill separately for their services.  Please contact them directly for any questions concerning their bills at the numbers below:     Emergency Physician Services:  [phone]  [facility]:  [phone]  [facility]:  [phone]  [facility]:  [phone]    1. Your final bill may vary from the amount quoted upon discharge if all procedures are not complete at that time, or if your doctor has additional procedures of which we are not aware. You will receive an additional bill if you return to the Emergency Department at [facility] for suture removal regardless of the facility of which the sutures were placed.     2. Please arrange for settlement of this account at the emergency registration.    3. All self-pay accounts are due in full at the time of treatment.  If you are unable to meet this obligation then payment is expected within 4-5 days.     4. If you have had radiology studies (CT, X-ray, Ultrasound, MRI), you have received a preliminary result during your emergency department visit. Please contact the radiology department [phone] to receive a copy of your final result. Please discuss the Final result with your primary physician or with the follow up physician provided.     Crisis Hotline:  National Crisis Hotline:   3-767-ZVTFODW or 1-540.840.6400  [place] Crisis Hotline:    1-690.129.6029 or 971-880-2154         ED Discharge Follow Up Questions    1. In order to provide you with very good care, we would like to follow up with a phone call in the next few days.  May we have your permission to contact you?     YES /  NO    2. What is the best phone number to call you? (       )_____-__________    3. What is the best time to call you?      Morning  /  Afternoon  /  Evening                   Patient Signature:  ____________________________________________________________    Date:  ____________________________________________________________      Your appointments     Aug 22, 2017 11:00 AM   GYN Visit with Ezra Matthew M.D.   [facility] ([address], [address] [id number] [phone]            Mar 09, 2018  4:00 PM   Established Patient with Roxane Da Silva D.O.   [facility]    [address] [place] [id number] [phone]           You will be receiving a confirmation call a few days before your appointment from our automated call confirmation system.

## 2017-07-28 NOTE — PROGRESS NOTES
"Diane Duke,  27 y.o.  female presents today with a C/O of :oligomenorrhea. Pt   Patient's last menstrual period was 2017.     Patient last seen last year and discussed pregnancy , vitamins , etc. PAtient stopped OCP's and apparently had irregual;r cycle intervals and got prenant . Patient with vaginal spotting , mild cramps and seen at Urgent Care today      Subjective : Nausea/Vomiting: No:  Abdominal /pelvic cramping : Yes :   vaginal bleeding:Yes      Menstrual Flow : variable can be heavy   GYN ROS:  normal menses, no abnormal bleeding, pelvic pain or discharge, no breast pain or new or enlarging lumps on self exam      Past Medical History   Diagnosis Date   • Hemorrhoids    • Bronchitis    • Hay fever yearly   • Pneumonia      Social History     Social History   • Marital Status:      Spouse Name: N/A   • Number of Children: N/A   • Years of Education: N/A     Occupational History   • Not on file.     Social History Main Topics   • Smoking status: Never Smoker    • Smokeless tobacco: Never Used   • Alcohol Use: No   • Drug Use: No   • Sexual Activity:     Partners: Male     Other Topics Concern   • Not on file     Social History Narrative       Past Surgical History   Procedure Laterality Date   • Appendectomy     • Primary c section         Current Birth control:  none    OB History    Para Term  AB SAB TAB Ectopic Multiple Living   3 2        2      # Outcome Date GA Lbr Narayan/2nd Weight Sex Delivery Anes PTL Lv   3 Current            2 Para    3.43 kg (7 lb 9 oz) M CS-Classical  N Y   1 Para     M VAGINAL AVA  N Y              Allergy:      Sulfa drugs    Exam;    /72 mmHg  Ht 1.702 m (5' 7\")  Wt 95.709 kg (211 lb)  BMI 33.04 kg/m2  LMP 2017  Breastfeeding? Unknown  well-appearing, well-hydrated, well-nourished, overweight  normal;   PERRLA, EOMI, fundi grossly normal, no papilledema, no AV nicking, sclera clear  Clear  RRR No M  abdomen is " soft without significant tenderness, masses, organomegaly or guarding  External genitalia normal, Vagina normal without discharge, Urethra without abnormality or discharge, cervix normal in appearanceLab.    Recent Results (from the past 336 hour(s))   RH TYPE FOR RHOGAM FROM E.D.    Collection Time: 07/28/17  5:40 AM   Result Value Ref Range    Emergency Department Rh Typing POS     Number Of Rh Doses Indicated ZERO    CBC WITH DIFFERENTIAL    Collection Time: 07/28/17  5:44 AM   Result Value Ref Range    WBC 7.1 4.8 - 10.8 K/uL    RBC 5.20 4.20 - 5.40 M/uL    Hemoglobin 15.8 12.0 - 16.0 g/dL    Hematocrit 45.0 37.0 - 47.0 %    MCV 86.5 81.4 - 97.8 fL    MCH 30.4 27.0 - 33.0 pg    MCHC 35.1 (H) 33.6 - 35.0 g/dL    RDW 44.3 35.9 - 50.0 fL    Platelet Count 259 164 - 446 K/uL    MPV 9.5 9.0 - 12.9 fL    Neutrophils-Polys 50.20 44.00 - 72.00 %    Lymphocytes 41.50 (H) 22.00 - 41.00 %    Monocytes 4.90 0.00 - 13.40 %    Eosinophils 2.50 0.00 - 6.90 %    Basophils 0.60 0.00 - 1.80 %    Immature Granulocytes 0.30 0.00 - 0.90 %    Nucleated RBC 0.00 /100 WBC    Neutrophils (Absolute) 3.56 2.00 - 7.15 K/uL    Lymphs (Absolute) 2.94 1.00 - 4.80 K/uL    Monos (Absolute) 0.35 0.00 - 0.85 K/uL    Eos (Absolute) 0.18 0.00 - 0.51 K/uL    Baso (Absolute) 0.04 0.00 - 0.12 K/uL    Immature Granulocytes (abs) 0.02 0.00 - 0.11 K/uL    NRBC (Absolute) 0.00 K/uL   HCG QUANTITATIVE SERUM    Collection Time: 07/28/17  5:44 AM   Result Value Ref Range    Bhcg 7623.0 (H) 0.0 - 10.0 mIU/mL   COMP METABOLIC PANEL    Collection Time: 07/28/17  5:44 AM   Result Value Ref Range    Sodium 137 135 - 145 mmol/L    Potassium 3.6 3.6 - 5.5 mmol/L    Chloride 103 96 - 112 mmol/L    Co2 24 20 - 33 mmol/L    Anion Gap 10.0 0.0 - 11.9    Glucose 95 65 - 99 mg/dL    Bun 5 (L) 8 - 22 mg/dL    Creatinine 0.69 0.50 - 1.40 mg/dL    Calcium 9.1 8.4 - 10.2 mg/dL    AST(SGOT) 18 12 - 45 U/L    ALT(SGPT) 21 2 - 50 U/L    Alkaline Phosphatase 56 30 - 99 U/L     Total Bilirubin 0.9 0.1 - 1.5 mg/dL    Albumin 3.7 3.2 - 4.9 g/dL    Total Protein 6.9 6.0 - 8.2 g/dL    Globulin 3.2 1.9 - 3.5 g/dL    A-G Ratio 1.2 g/dL   LIPASE    Collection Time: 17  5:44 AM   Result Value Ref Range    Lipase 22 7 - 58 U/L   ESTIMATED GFR    Collection Time: 17  5:44 AM   Result Value Ref Range    GFR If African American >60 >60 mL/min/1.73 m 2    GFR If Non African American >60 >60 mL/min/1.73 m 2   URINALYSIS CULTURE, IF INDICATED    Collection Time: 17  6:05 AM   Result Value Ref Range    Micro Urine Req Microscopic     Color Straw     Character Clear     Specific Gravity <=1.005 <1.035    Ph 6.0 5.0-8.0    Glucose Negative Negative mg/dL    Ketones Negative Negative mg/dL    Protein Negative Negative mg/dL    Bilirubin Negative Negative    Nitrite Negative Negative    Leukocyte Esterase Negative Negative    Occult Blood Small (A) Negative    Culture Indicated No UA Culture   URINE MICROSCOPIC (W/UA)    Collection Time: 17  6:05 AM   Result Value Ref Range    WBC Rare /hpf    RBC 0-2 /hpf    Epithelial Cells Rare Few /hpf   WET PREP    Collection Time: 17  6:14 AM   Result Value Ref Range    Significant Indicator NEG     Source GEN     Site VAGINAL     Wet Prep For Parasites       No yeast.  No motile Trichomonas seen.  No clue cells seen.     CHLAMYDIA & GC BY PCR    Collection Time: 17  6:14 AM   Result Value Ref Range    Source Vaginal      Ultrasound :    Per my Read   Transvaginal      First trimester findings: Intrauterine gestational sac seen: yes  Gestational sac summary: yolk sac seen, no pole , asyymetric sac , EGA: 7 weeks + 0 days  Fetal cardiac activity: absent  Crown-rump length: not seen , amnion , no pole ,     Assessment:    missed/threatened   Extensive discussion regarding blighted ovum , vs missed  . Discuss normal progression and lost. Patient prepared for cramps , bleeding and lost     Plan:  4 weeks  Serial labs , to  assess prog and hcg levels

## 2017-07-28 NOTE — ED AVS SNAPSHOT
Oxitec Access Code: Activation code not generated  Current Oxitec Status: Active    Emergent Viewshart  A secure, online tool to manage your health information     [employer]’s Oxitec® is a secure, online tool that connects you to your personalized health information from the privacy of your home -- day or night - making it very easy for you to manage your healthcare. Once the activation process is completed, you can even access your medical information using the Oxitec socorro, which is available for free in the Apple Socorro store or Google Play store.     Oxitec provides the following levels of access (as shown below):   My Chart Features   [facility] Primary Care Doctor [facility]  Specialists [facility]  Urgent  Care Non-[facility]  Primary Care  Doctor   Email your healthcare team securely and privately 24/7 X X X X   Manage appointments: schedule your next appointment; view details of past/upcoming appointments X      Request prescription refills. X      View recent personal medical records, including lab and immunizations X X X X   View health record, including health history, allergies, medications X X X X   Read reports about your outpatient visits, procedures, consult and ER notes X X X X   See your discharge summary, which is a recap of your hospital and/or ER visit that includes your diagnosis, lab results, and care plan. X X       How to register for Oxitec:  1. Go to  [email or web address].  2. Click on the Sign Up Now box, which takes you to the New Member Sign Up page. You will need to provide the following information:  a. Enter your Oxitec Access Code exactly as it appears at the top of this page. (You will not need to use this code after you’ve completed the sign-up process. If you do not sign up before the expiration date, you must request a new code.)   b. Enter your date of birth.   c. Enter your home email address.   d. Click Submit, and follow the next screen’s instructions.  3. Create a Oxitec ID. This will  be your Revel Touch login ID and cannot be changed, so think of one that is secure and easy to remember.  4. Create a Revel Touch password. You can change your password at any time.  5. Enter your Password Reset Question and Answer. This can be used at a later time if you forget your password.   6. Enter your e-mail address. This allows you to receive e-mail notifications when new information is available in Revel Touch.  7. Click Sign Up. You can now view your health information.    For assistance activating your Revel Touch account, call [phone]

## 2017-07-29 ENCOUNTER — HOSPITAL ENCOUNTER (OUTPATIENT)
Dept: LAB | Facility: MEDICAL CENTER | Age: 27
End: 2017-07-29
Attending: OBSTETRICS & GYNECOLOGY
Payer: OTHER GOVERNMENT

## 2017-07-29 DIAGNOSIS — O02.1 MISSED ABORTION: ICD-10-CM

## 2017-07-29 LAB
B-HCG SERPL-ACNC: 3662.1 MIU/ML (ref 0–5)
PROGEST SERPL-MCNC: 1.23 NG/ML

## 2017-07-29 PROCEDURE — 36415 COLL VENOUS BLD VENIPUNCTURE: CPT

## 2017-07-29 PROCEDURE — 84702 CHORIONIC GONADOTROPIN TEST: CPT

## 2017-07-29 PROCEDURE — 84144 ASSAY OF PROGESTERONE: CPT

## 2017-07-31 ENCOUNTER — HOSPITAL ENCOUNTER (OUTPATIENT)
Dept: LAB | Facility: MEDICAL CENTER | Age: 27
End: 2017-07-31
Attending: OBSTETRICS & GYNECOLOGY
Payer: OTHER GOVERNMENT

## 2017-07-31 DIAGNOSIS — O02.1 MISSED ABORTION: ICD-10-CM

## 2017-07-31 LAB
B-HCG SERPL-ACNC: 1020.5 MIU/ML (ref 0–5)
PROGEST SERPL-MCNC: 0.51 NG/ML

## 2017-07-31 PROCEDURE — 36415 COLL VENOUS BLD VENIPUNCTURE: CPT

## 2017-07-31 PROCEDURE — 84144 ASSAY OF PROGESTERONE: CPT

## 2017-07-31 PROCEDURE — 84702 CHORIONIC GONADOTROPIN TEST: CPT

## 2017-08-22 ENCOUNTER — GYNECOLOGY VISIT (OUTPATIENT)
Dept: OBGYN | Facility: CLINIC | Age: 27
End: 2017-08-22
Payer: OTHER GOVERNMENT

## 2017-08-22 VITALS
HEIGHT: 67 IN | SYSTOLIC BLOOD PRESSURE: 124 MMHG | DIASTOLIC BLOOD PRESSURE: 76 MMHG | BODY MASS INDEX: 32.8 KG/M2 | WEIGHT: 209 LBS

## 2017-08-22 DIAGNOSIS — O03.9 COMPLETE ABORTION: ICD-10-CM

## 2017-08-22 PROCEDURE — 99213 OFFICE O/P EST LOW 20 MIN: CPT | Performed by: OBSTETRICS & GYNECOLOGY

## 2017-08-22 NOTE — MR AVS SNAPSHOT
"        Diane Duke   2017 11:00 AM   Gynecology Visit   MRN: [id number]    Department:  [facility]   Dept Phone:  [phone]    Description:  Female : 1990   Provider:  Ezra Matthew M.D.           Reason for Visit     Follow-Up           Allergies as of 2017     Allergen Noted Reactions    Sulfa Drugs 2015         Vital Signs     Blood Pressure Height Weight Body Mass Index Last Menstrual Period Breastfeeding?    124/76 mmHg 1.702 m (5' 7\") 94.802 kg (209 lb) 32.73 kg/m2 2017 No    Smoking Status                   Never Smoker            Basic Information     Date Of Birth Sex Race Ethnicity Preferred Language    1990 Female White, White Non- English      Your appointments     2017  2:15 PM   GYN Visit with Ezra Matthew M.D.   [facility] ([address], [address] [id number] [phone]            Mar 09, 2018  4:00 PM   Established Patient with Roxane Da Silva D.O.   [facility]    [address] [place] [id number] [phone]           You will be receiving a confirmation call a few days before your appointment from our automated call confirmation system.              Problem List              ICD-10-CM Priority Class Noted - Resolved    Irregular periods/menstrual cycles N92.6   3/4/2016 - Present    Encounter for preconception consultation Z31.69   2016 - Present    Obesity (BMI 30-39.9) E66.9   3/10/2017 - Present      Health Maintenance        Date Due Completion Dates    IMM DTaP/Tdap/Td Vaccine (1 - Tdap) 3/20/2008 ---    IMM INFLUENZA (1) 2017 ---    PAP SMEAR 2019, 2015            Current Immunizations     No immunizations on file.      Below and/or attached are the medications your provider expects you to take. Review all of your home medications and newly ordered medications with your provider and/or pharmacist. Follow " medication instructions as directed by your provider and/or pharmacist. Please keep your medication list with you and share with your provider. Update the information when medications are discontinued, doses are changed, or new medications (including over-the-counter products) are added; and carry medication information at all times in the event of emergency situations     Allergies:  SULFA DRUGS - (reactions not documented)               Medications  Valid as of: August 22, 2017 - 11:52 AM    Generic Name Brand Name Tablet Size Instructions for use    Ibuprofen (Tab) MOTRIN 600 MG Take 1 Tab by mouth 3 times a day, with meals. As needed for pain        Ondansetron (TABLET DISPERSIBLE) ZOFRAN ODT 4 MG Take 1 Tab by mouth every 8 hours as needed for Nausea/Vomiting.        Prenatal [id number]-Uuswef (Tab) PRENATE AM 1 MG Take 1 Tab by mouth every day.        .                 Medicines prescribed today were sent to:     [facility] [id number] [place] [address] [facility]    [address] [facility] [address]    Phone: [phone] Fax: [phone]    Open 24 Hours?: No    [facility] - [place], [place] - [address] [place] [address]    [address] [facility] [address]    Phone: [phone] Fax: [phone]    Open 24 Hours?: No      Medication refill instructions:       If your prescription bottle indicates you have medication refills left, it is not necessary to call your provider’s office. Please contact your pharmacy and they will refill your medication.    If your prescription bottle indicates you do not have any refills left, you may request refills at any time through one of the following ways: The online [employer] system (except Urgent Care), by calling your provider’s office, or by asking your pharmacy to contact your provider’s office with a refill request. Medication refills are processed only during regular business hours and may not be  available until the next business day. Your provider may request additional information or to have a follow-up visit with you prior to refilling your medication.   *Please Note: Medication refills are assigned a new Rx number when refilled electronically. Your pharmacy may indicate that no refills were authorized even though a new prescription for the same medication is available at the pharmacy. Please request the medicine by name with the pharmacy before contacting your provider for a refill.           [employer] Access Code: Activation code not generated  Current [employer] Status: Active

## 2017-08-22 NOTE — PROGRESS NOTES
" 27 y.o.  female previously seen for : Chief Complaint:  Missed      Specialty Problems     None      . Patient now here in follow up. PAtient last seen and dx of Missed aboriton ,made. PAtient advised on spontaneous passage of tissue , and in deed it occurred and patient has no further bleeding or pain     Patient's last menstrual period was 2017.      Subjective: Abdominal Pain: negative    Vaginal Bleedingnegative  Menstrual Cycle: normal: negative  Dysmenorrhea:positive:   Dyspareunia:negative  Urinary Symptoms: negative   Vaginal Discharge:{No          Current outpatient prescriptions:   •  ibuprofen (MOTRIN) 600 MG Tab, Take 1 Tab by mouth 3 times a day, with meals. As needed for pain, Disp: 20 Tab, Rfl: 0  •  ondansetron (ZOFRAN ODT) 4 MG TABLET DISPERSIBLE, Take 1 Tab by mouth every 8 hours as needed for Nausea/Vomiting., Disp: 12 Tab, Rfl: 0  •  Prenatal [id number]-Jhjrih (PRENATE AM) 1 MG Tab, Take 1 Tab by mouth every day., Disp: 90 Tab, Rfl: 3  ROS: no change in ROS since visit of : 2017.  :No results found for this or any previous visit (from the past 336 hour(s)).    Filed Vitals:    17 1120   BP: 124/76   Height: 1.702 m (5' 7\")   Weight: 94.802 kg (209 lb)     Past Medical History   Diagnosis Date   • Hemorrhoids    • Bronchitis    • Hay fever yearly   • Pneumonia      PGYN: SAB   Social History     Social History   • Marital Status:      Spouse Name: N/A   • Number of Children: N/A   • Years of Education: N/A     Occupational History   • Not on file.     Social History Main Topics   • Smoking status: Never Smoker    • Smokeless tobacco: Never Used   • Alcohol Use: No   • Drug Use: No   • Sexual Activity:     Partners: Male     Other Topics Concern   • Not on file     Social History Narrative     Family History   Problem Relation Age of Onset   • Hypertension Mother    • Diabetes Mother      type 2   • Other Paternal Uncle      obesity   • Hypertension " Maternal Grandmother    • Depression Maternal Grandmother    • Diabetes Maternal Grandmother    • Cancer Maternal Grandfather      colon cancer & eye cancer   • Asthma Paternal Grandmother    • Anemia Other    • Hyperlipidemia Father      Past Surgical History   Procedure Laterality Date   • Appendectomy     • Primary c section           Exam:   General : Awake, alert and oriented x 3  Abdominal : no masses, no organomegaly, nontender no rebound or guarding  Pelvic :  external genitalia normal, Bartholin's glands, urethra, Enoch's glands negative, vaginal mucosa normal;  Mobile , normal uterus   Pelvic Support system : normal    U/S : OB ULTRASOUND LIMITED SUMMARY  Per my Read   transvaginal  First trimester findings: Intrauterine gestational sac seen: no  Uterus normal size , endometrial stripe thicken , but no fluid , ovaries notr well seen , no cul de sac fluid   Ass: Complete             Desires pregnancy     Spent 15 minutes minutes with the patient ; Face to Face, with >50% of this time spent in counseling and coordination of care, surrounding the above mentioned issues as well as:  P. Recommend 2 cycles , prior to further pregnancy attempt   Prenatal vitamins   Condoms   RT 10/2017 to assess hormones before pregnancy     Follow up : Return visit in 2 month(s)

## 2017-09-20 ENCOUNTER — OFFICE VISIT (OUTPATIENT)
Dept: MEDICAL GROUP | Facility: PHYSICIAN GROUP | Age: 27
End: 2017-09-20
Payer: OTHER GOVERNMENT

## 2017-09-20 VITALS
TEMPERATURE: 98 F | WEIGHT: 200 LBS | HEIGHT: 67 IN | SYSTOLIC BLOOD PRESSURE: 116 MMHG | DIASTOLIC BLOOD PRESSURE: 82 MMHG | HEART RATE: 79 BPM | BODY MASS INDEX: 31.39 KG/M2 | RESPIRATION RATE: 16 BRPM | OXYGEN SATURATION: 99 %

## 2017-09-20 DIAGNOSIS — O03.9 MISCARRIAGE: ICD-10-CM

## 2017-09-20 DIAGNOSIS — Z23 NEED FOR VACCINATION: ICD-10-CM

## 2017-09-20 PROCEDURE — 90715 TDAP VACCINE 7 YRS/> IM: CPT | Performed by: FAMILY MEDICINE

## 2017-09-20 PROCEDURE — 90686 IIV4 VACC NO PRSV 0.5 ML IM: CPT | Performed by: FAMILY MEDICINE

## 2017-09-20 PROCEDURE — 90471 IMMUNIZATION ADMIN: CPT | Performed by: FAMILY MEDICINE

## 2017-09-20 PROCEDURE — 90472 IMMUNIZATION ADMIN EACH ADD: CPT | Performed by: FAMILY MEDICINE

## 2017-09-20 PROCEDURE — 99213 OFFICE O/P EST LOW 20 MIN: CPT | Mod: 25 | Performed by: FAMILY MEDICINE

## 2017-09-20 NOTE — ASSESSMENT & PLAN NOTE
Patient is here today to discuss her recent miscarriage. She states that her miscarriage was in July. At the time she was at 10 weeks gestation. She did complete the miscarriage at home and has followed up with her OB. She states that she is on an ultrasound and was told that everything was completely cleared.    She has multiple questions as to why the miscarriage could have potentially occurred. I have reviewed statistical analysis which states that any pregnancy has a 20% chance of miscarriage; also reviewed her risk factors which are none. Patient has had 2 previous normal healthy pregnancies and another had a miscarriage in the past. She reports to me that she is ready to try again. I have encouraged patient to evaluate herself emotionally determine if she is mentally prepared for this. She states that she is continuing on her prenatal vitamin and is continuing healthy habits including no smoking, no alcohol, no illicit drugs.

## 2017-09-20 NOTE — PROGRESS NOTES
"Subjective:   Diane Duke is a 27 y.o. female here today for Miscarriage; vaccines    Miscarriage  Patient is here today to discuss her recent miscarriage. She states that her miscarriage was in July. At the time she was at 10 weeks gestation. She did complete the miscarriage at home and has followed up with her OB. She states that she is on an ultrasound and was told that everything was completely cleared.    She has multiple questions as to why the miscarriage could have potentially occurred. I have reviewed statistical analysis which states that any pregnancy has a 20% chance of miscarriage; also reviewed her risk factors which are none. Patient has had 2 previous normal healthy pregnancies and another had a miscarriage in the past. She reports to me that she is ready to try again. I have encouraged patient to evaluate herself emotionally determine if she is mentally prepared for this. She states that she is continuing on her prenatal vitamin and is continuing healthy habits including no smoking, no alcohol, no illicit drugs.         Current medicines (including changes today)  Current Outpatient Prescriptions   Medication Sig Dispense Refill   • Prenatal Ks-[id number]-Vipjig (PRENATE AM) 1 MG Tab Take 1 Tab by mouth every day. 90 Tab 3     No current facility-administered medications for this visit.      She  has a past medical history of Bronchitis (2013); Hay fever (yearly); Hemorrhoids (2015); and Pneumonia. She also has no past medical history of Diabetes ([facility]); Headache; or Hypertension.    ROS   No chest pain, no shortness of breath, no abdominal pain       Objective:     Blood pressure 116/82, pulse 79, temperature 36.7 °C (98 °F), resp. rate 16, height 1.702 m (5' 7\"), weight 90.7 kg (200 lb), last menstrual period 08/08/2017, SpO2 99 %. Body mass index is 31.32 kg/m².   Physical Exam:  Alert, oriented in no acute distress.  Eye contact is good, speech goal directed, affect calm  HEENT: conjunctiva " non-injected, sclera non-icteric.  Pinna normal. Oral mucous membranes pink and moist with no lesions.  Neck No adenopathy or masses in the neck or supraclavicular regions.  Lungs: clear to auscultation bilaterally with good excursion.  CV: regular rate and rhythm.  Psych: Normal affect and mood    Assessment and Plan:   The following treatment plan was discussed     1. Miscarriage      Resolved. Patient reports that she wants to try for another pregnancy; reviewed healthy habits; monitor   2. Need for vaccination  Flu Quad Inj >3 Year Pre-Filled PF    Tdap =>8yo IM    Age appropriate immunization (influenza and tdap) provided; patient tolerated procedure well.     Keep appointment for next spring  Followup: Return if symptoms worsen or fail to improve.

## 2017-09-30 ENCOUNTER — OFFICE VISIT (OUTPATIENT)
Dept: URGENT CARE | Facility: CLINIC | Age: 27
End: 2017-09-30
Payer: OTHER GOVERNMENT

## 2017-09-30 VITALS
DIASTOLIC BLOOD PRESSURE: 70 MMHG | TEMPERATURE: 100.9 F | SYSTOLIC BLOOD PRESSURE: 130 MMHG | HEIGHT: 67 IN | RESPIRATION RATE: 18 BRPM | WEIGHT: 200 LBS | OXYGEN SATURATION: 95 % | HEART RATE: 130 BPM | BODY MASS INDEX: 31.39 KG/M2

## 2017-09-30 DIAGNOSIS — J02.0 PHARYNGITIS DUE TO STREPTOCOCCUS SPECIES: ICD-10-CM

## 2017-09-30 DIAGNOSIS — J02.0 STREP THROAT: ICD-10-CM

## 2017-09-30 LAB
INT CON NEG: NEGATIVE
INT CON POS: POSITIVE
S PYO AG THROAT QL: POSITIVE

## 2017-09-30 PROCEDURE — 99214 OFFICE O/P EST MOD 30 MIN: CPT | Performed by: NURSE PRACTITIONER

## 2017-09-30 PROCEDURE — 87880 STREP A ASSAY W/OPTIC: CPT | Performed by: NURSE PRACTITIONER

## 2017-09-30 RX ORDER — AMOXICILLIN 875 MG/1
875 TABLET, COATED ORAL 2 TIMES DAILY
Qty: 20 TAB | Refills: 0 | Status: SHIPPED | OUTPATIENT
Start: 2017-09-30 | End: 2017-10-10

## 2017-09-30 RX ORDER — METHYLPREDNISOLONE 4 MG/1
TABLET ORAL
Qty: 1 KIT | Refills: 0 | Status: SHIPPED | OUTPATIENT
Start: 2017-09-30 | End: 2023-02-24

## 2017-09-30 ASSESSMENT — ENCOUNTER SYMPTOMS
SPEECH CHANGE: 0
RESPIRATORY NEGATIVE: 1
DIZZINESS: 0
SENSORY CHANGE: 0
SEIZURES: 0
CHILLS: 1
FOCAL WEAKNESS: 0
TROUBLE SWALLOWING: 1
TINGLING: 0
TREMORS: 0
HEADACHES: 1
SWOLLEN GLANDS: 1
LOSS OF CONSCIOUSNESS: 0
SORE THROAT: 1
FEVER: 1
MYALGIAS: 1

## 2017-09-30 NOTE — PROGRESS NOTES
Subjective:      Diane Duke is a 27 y.o. female who presents with Fever (bodyaches, headache, ear pain, hurts to swallow )    Past Medical History:   Diagnosis Date   • Hemorrhoids 2015   • Bronchitis 2013   • Hay fever yearly   • Pneumonia      Social History     Social History   • Marital status:      Spouse name: N/A   • Number of children: N/A   • Years of education: N/A     Occupational History   • Not on file.     Social History Main Topics   • Smoking status: Never Smoker   • Smokeless tobacco: Never Used   • Alcohol use No   • Drug use: No   • Sexual activity: Yes     Partners: Male     Other Topics Concern   • Not on file     Social History Narrative   • No narrative on file     Family History   Problem Relation Age of Onset   • Hypertension Mother    • Diabetes Mother      type 2   • Anemia Other    • Hyperlipidemia Father    • Other Paternal Uncle      obesity   • Hypertension Maternal Grandmother    • Depression Maternal Grandmother    • Diabetes Maternal Grandmother    • Cancer Maternal Grandfather      colon cancer & eye cancer   • Asthma Paternal Grandmother      Allergies: Sulfa drugs    This patient is a 19-year-old female who presents today with complaint of sore throat, fever, aches, and chills. Symptoms started 2 days ago. She denies any cough or chest congestion. She does have difficulty swallowing. No respiratory distress or difficulty breathing.          Pharyngitis    This is a new problem. The current episode started in the past 7 days. The problem has been gradually worsening. Neither side of throat is experiencing more pain than the other. The maximum temperature recorded prior to her arrival was 102 - 102.9 F. The fever has been present for 1 to 2 days. The pain is moderate. Associated symptoms include headaches, swollen glands and trouble swallowing. She has tried nothing for the symptoms. The treatment provided no relief.       Review of Systems   Constitutional: Positive for  "chills, fever and malaise/fatigue.   HENT: Positive for sore throat and trouble swallowing.    Respiratory: Negative.    Musculoskeletal: Positive for myalgias.   Neurological: Positive for headaches. Negative for dizziness, tingling, tremors, sensory change, speech change, focal weakness, seizures and loss of consciousness.   All other systems reviewed and are negative.         Objective:     /70   Pulse (!) 130   Temp (!) 38.3 °C (100.9 °F)   Resp 18   Ht 1.702 m (5' 7\")   Wt 90.7 kg (200 lb)   LMP 08/08/2017   SpO2 95%   Breastfeeding? No   BMI 31.32 kg/m²      Physical Exam   Constitutional: She is oriented to person, place, and time. She appears well-developed and well-nourished. No distress.   Patient is non-toxic in appearance.    HENT:   Head: Normocephalic.   Right Ear: External ear normal.   Left Ear: External ear normal.   Oropharynx bright red   Eyes: EOM are normal. Pupils are equal, round, and reactive to light.   Neck: Normal range of motion. Neck supple.   Cardiovascular: Regular rhythm and normal heart sounds.    Pulmonary/Chest: Effort normal and breath sounds normal. No respiratory distress.   Respirations even, unlabored. No respiratory distress noted.   Lymphadenopathy:     She has cervical adenopathy.   Neurological: She is alert and oriented to person, place, and time.   Skin: Skin is warm and dry. Capillary refill takes less than 2 seconds. No rash noted. She is not diaphoretic.   Psychiatric: She has a normal mood and affect. Her behavior is normal. Judgment and thought content normal.   Vitals reviewed.    poct strep: positive           Assessment/Plan:     1. Pharyngitis due to Streptococcus species    - POCT Rapid Strep A  - MethylPREDNISolone (MEDROL DOSEPAK) 4 MG Tablet Therapy Pack; Take according to package instructions  Dispense: 1 Kit; Refill: 0  - lidocaine viscous 2% (XYLOCAINE) 2 % Solution; Take 15 mL, mix with equal amount of water; gargle/spit every 3 hours as " needed for pain  Dispense: 120 mL; Refill: 0  -Strict ER precautions for respiratory distress, difficulty breathing or worsening of symptoms.    2. Strep throat    - amoxicillin (AMOXIL) 875 MG tablet; Take 1 Tab by mouth 2 times a day for 10 days.  Dispense: 20 Tab; Refill: 0

## 2017-11-01 ENCOUNTER — GYNECOLOGY VISIT (OUTPATIENT)
Dept: OBGYN | Facility: CLINIC | Age: 27
End: 2017-11-01
Payer: OTHER GOVERNMENT

## 2017-11-01 VITALS
HEIGHT: 67 IN | SYSTOLIC BLOOD PRESSURE: 120 MMHG | BODY MASS INDEX: 33.43 KG/M2 | DIASTOLIC BLOOD PRESSURE: 76 MMHG | WEIGHT: 213 LBS

## 2017-11-01 DIAGNOSIS — N97.8 INFERTILITY DUE TO LUTEAL PHASE DEFECT: ICD-10-CM

## 2017-11-01 PROCEDURE — 99213 OFFICE O/P EST LOW 20 MIN: CPT | Performed by: OBSTETRICS & GYNECOLOGY

## 2017-11-01 NOTE — LETTER
November 1, 2017        Diane Duke    Infertility Check Sheet    First Day of period : day 1 of Menses.  ( all labs, appointments are centered around knowing this day)    Medication:     Clomid : take days (3-7)   Or  ( days 5-9)  From first day of menses    Urine Ovulation Prediction:  Days 11-16   from first day of menses    No sex from days 11-16    Timed [place]: 12-36 hrs from urine test postive  ( May have sex at 12/24 and 36 hr interval)    Additional Tests:   PCT ( post -coital test) Have sex day prior to urine ovulation surge). Present to doctors office within 2 hrs of sexual intercourse ( no shower, douching , etc)'  HSG : radiology exam , timed first 7 days of cycle. This test is only done , after others are negative, or if you have strong history of pelvic surgeries , and/or prior Ectopic Pregnancy    Blood Test:  You must get these tests every month , or clomid will not be refilled     Day 21 and day 25 from first day of menses    Schedule follow up appointment from day 27-30                             Ezra Matthew

## 2017-11-01 NOTE — PROGRESS NOTES
" 27 y.o.  female previously seen for : Chief Complaint:  Recent SAB . Work up     Specialty Problems     None      . Patient now here in follow up.     Patient's last menstrual period was 2017.      Subjective: Abdominal Pain: negative    Vaginal Bleedingnegative  Menstrual Cycle: normal: positive  Dysmenorrhea:negative:   Dyspareunia:negative  Urinary Symptoms: negative   Vaginal Discharge:{No          Current Outpatient Prescriptions:   •  MethylPREDNISolone (MEDROL DOSEPAK) 4 MG Tablet Therapy Pack, Take according to package instructions, Disp: 1 Kit, Rfl: 0  •  lidocaine viscous 2% (XYLOCAINE) 2 % Solution, Take 15 mL, mix with equal amount of water; gargle/spit every 3 hours as needed for pain, Disp: 120 mL, Rfl: 0  •  Prenatal [id number] (PRENATE AM) 1 MG Tab, Take 1 Tab by mouth every day., Disp: 90 Tab, Rfl: 3  ROS: no change in ROS since visit of : 2017.  :No results found for this or any previous visit (from the past 336 hour(s)).    Vitals:    17 1416   BP: 120/76   Weight: 96.6 kg (213 lb)   Height: 1.702 m (5' 7\")     Past Medical History:   Diagnosis Date   • Bronchitis    • Hay fever yearly   • Hemorrhoids    • Pneumonia      PGYN: SAB 2017  Social History     Social History   • Marital status:      Spouse name: N/A   • Number of children: N/A   • Years of education: N/A     Occupational History   • Not on file.     Social History Main Topics   • Smoking status: Never Smoker   • Smokeless tobacco: Never Used   • Alcohol use No   • Drug use: No   • Sexual activity: Yes     Partners: Male     Other Topics Concern   • Not on file     Social History Narrative   • No narrative on file     Family History   Problem Relation Age of Onset   • Hypertension Mother    • Diabetes Mother      type 2   • Anemia Other    • Hyperlipidemia Father    • Other Paternal Uncle      obesity   • Hypertension Maternal Grandmother    • Depression Maternal Grandmother    • Diabetes " Maternal Grandmother    • Cancer Maternal Grandfather      colon cancer & eye cancer   • Asthma Paternal Grandmother      Past Surgical History:   Procedure Laterality Date   • PRIMARY C SECTION  2012   • APPENDECTOMY  1996         Exam: deferred     Ass: S/P SAB , Desires hormonal evaluation     Spent 15 minutes minutes with the patient ; Face to Face, with >50% of this time spent in counseling and coordination of care, surrounding the above mentioned issues as well as:  P.discuss infertility sheet   Need Luteal phase Hormone evaluations   Prog Day 21/25   RTC 4 weeks

## 2017-11-22 LAB — PROGEST SERPL-MCNC: 15.6 NG/ML

## 2017-11-27 ENCOUNTER — HOSPITAL ENCOUNTER (OUTPATIENT)
Dept: LAB | Facility: MEDICAL CENTER | Age: 27
End: 2017-11-27
Attending: OBSTETRICS & GYNECOLOGY
Payer: OTHER GOVERNMENT

## 2017-11-27 PROCEDURE — 84144 ASSAY OF PROGESTERONE: CPT

## 2017-11-27 PROCEDURE — 36415 COLL VENOUS BLD VENIPUNCTURE: CPT

## 2017-11-28 LAB — PROGEST SERPL-MCNC: 24.07 NG/ML

## 2017-12-02 ENCOUNTER — OFFICE VISIT (OUTPATIENT)
Dept: URGENT CARE | Facility: CLINIC | Age: 27
End: 2017-12-02
Payer: OTHER GOVERNMENT

## 2017-12-02 VITALS
BODY MASS INDEX: 32.18 KG/M2 | TEMPERATURE: 97.6 F | WEIGHT: 205 LBS | HEART RATE: 104 BPM | DIASTOLIC BLOOD PRESSURE: 72 MMHG | OXYGEN SATURATION: 98 % | HEIGHT: 67 IN | SYSTOLIC BLOOD PRESSURE: 118 MMHG | RESPIRATION RATE: 16 BRPM

## 2017-12-02 DIAGNOSIS — J02.9 PHARYNGITIS, UNSPECIFIED ETIOLOGY: ICD-10-CM

## 2017-12-02 DIAGNOSIS — J02.0 STREP THROAT: ICD-10-CM

## 2017-12-02 PROCEDURE — 99214 OFFICE O/P EST MOD 30 MIN: CPT | Performed by: NURSE PRACTITIONER

## 2017-12-02 RX ORDER — AMOXICILLIN 500 MG/1
500 CAPSULE ORAL 3 TIMES DAILY
Qty: 30 CAP | Refills: 0 | Status: SHIPPED | OUTPATIENT
Start: 2017-12-02 | End: 2017-12-12

## 2017-12-02 ASSESSMENT — ENCOUNTER SYMPTOMS
VOMITING: 0
CARDIOVASCULAR NEGATIVE: 1
NAUSEA: 0
SORE THROAT: 1
EYES NEGATIVE: 1
SWOLLEN GLANDS: 1
FEVER: 1
TROUBLE SWALLOWING: 1
RESPIRATORY NEGATIVE: 1
MYALGIAS: 1
CHILLS: 1

## 2017-12-02 NOTE — PROGRESS NOTES
Subjective:      Diane Duke is a 27 y.o. female who presents with Pharyngitis (began yesterday, hard to swallow, pregnant )    Past Medical History:   Diagnosis Date   • Bronchitis 2013   • Hay fever yearly   • Hemorrhoids 2015   • Pneumonia      Social History     Social History   • Marital status:      Spouse name: N/A   • Number of children: N/A   • Years of education: N/A     Occupational History   • Not on file.     Social History Main Topics   • Smoking status: Never Smoker   • Smokeless tobacco: Never Used   • Alcohol use No   • Drug use: No   • Sexual activity: Yes     Partners: Male     Other Topics Concern   • Not on file     Social History Narrative   • No narrative on file     Family History   Problem Relation Age of Onset   • Hypertension Mother    • Diabetes Mother      type 2   • Anemia Other    • Hyperlipidemia Father    • Other Paternal Uncle      obesity   • Hypertension Maternal Grandmother    • Depression Maternal Grandmother    • Diabetes Maternal Grandmother    • Cancer Maternal Grandfather      colon cancer & eye cancer   • Asthma Paternal Grandmother        Allergies: Sulfa drugs    This patient is a 19-year-old female who presents today with complaint of sore throat. Symptoms started over the last 2 days. No other upper respiratory symptoms. She is currently 4 weeks pregnant.          Pharyngitis    This is a new problem. The current episode started in the past 7 days. The problem has been unchanged. Neither side of throat is experiencing more pain than the other. There has been no fever. Associated symptoms include swollen glands and trouble swallowing. Pertinent negatives include no vomiting. She has tried nothing for the symptoms. The treatment provided no relief.       Review of Systems   Constitutional: Positive for chills, fever and malaise/fatigue.   HENT: Positive for sore throat and trouble swallowing.    Eyes: Negative.    Respiratory: Negative.    Cardiovascular: Negative.  "   Gastrointestinal: Negative for nausea and vomiting.   Musculoskeletal: Positive for myalgias.   Skin: Negative.    All other systems reviewed and are negative.         Objective:     /72   Pulse (!) 104   Temp 36.4 °C (97.6 °F)   Resp 16   Ht 1.702 m (5' 7\")   Wt 93 kg (205 lb)   SpO2 98%   Breastfeeding? No   BMI 32.11 kg/m²      Physical Exam   Constitutional: She is oriented to person, place, and time. She appears well-developed and well-nourished. No distress.   HENT:   Head: Normocephalic.   Right Ear: External ear normal.   Oropharynx bright red    Eyes: EOM are normal. Pupils are equal, round, and reactive to light.   Neck: Normal range of motion. Neck supple.   Cardiovascular: Normal rate and regular rhythm.    Pulmonary/Chest: Effort normal and breath sounds normal.   Neurological: She is alert and oriented to person, place, and time.   Skin: Skin is warm and dry. Capillary refill takes less than 2 seconds. She is not diaphoretic.   Psychiatric: She has a normal mood and affect. Her behavior is normal. Judgment and thought content normal.   Vitals reviewed.    poct strep: positive             Assessment/Plan:   Strep throat   Pharyngitis   -amoxil   -warm saltwater gargles   -tylenol PRN   -follow up if symptoms persist or worsen    There are no diagnoses linked to this encounter.      "

## 2017-12-02 NOTE — LETTER
[facility]  [facility]  [address]  [place] [address]     April 13, 2019    Patient: Diane Duke   YOB: 1990   Date of Visit: 12/2/2017       To Whom It May Concern:    Diane Duke was seen and treated in our department on 12/2/2017.     Sincerely,     Leena Davidson

## 2017-12-11 ENCOUNTER — HOSPITAL ENCOUNTER (OUTPATIENT)
Dept: LAB | Facility: MEDICAL CENTER | Age: 27
End: 2017-12-11
Attending: OBSTETRICS & GYNECOLOGY
Payer: OTHER GOVERNMENT

## 2017-12-11 ENCOUNTER — GYNECOLOGY VISIT (OUTPATIENT)
Dept: OBGYN | Facility: CLINIC | Age: 27
End: 2017-12-11
Payer: OTHER GOVERNMENT

## 2017-12-11 VITALS — DIASTOLIC BLOOD PRESSURE: 70 MMHG | WEIGHT: 213 LBS | BODY MASS INDEX: 33.36 KG/M2 | SYSTOLIC BLOOD PRESSURE: 120 MMHG

## 2017-12-11 DIAGNOSIS — N93.8 DUB (DYSFUNCTIONAL UTERINE BLEEDING): ICD-10-CM

## 2017-12-11 LAB
B-HCG SERPL-ACNC: 5946.1 MIU/ML (ref 0–5)
PROGEST SERPL-MCNC: 21.57 NG/ML

## 2017-12-11 PROCEDURE — 84702 CHORIONIC GONADOTROPIN TEST: CPT

## 2017-12-11 PROCEDURE — 99214 OFFICE O/P EST MOD 30 MIN: CPT | Performed by: OBSTETRICS & GYNECOLOGY

## 2017-12-11 PROCEDURE — 84144 ASSAY OF PROGESTERONE: CPT

## 2017-12-11 PROCEDURE — 36415 COLL VENOUS BLD VENIPUNCTURE: CPT

## 2017-12-12 NOTE — PROGRESS NOTES
27 y.o.  female previously seen for : Chief Complaint:  Prev SAB , and work up in progress . Patient was to get luteal levels . Apparently as well had Positive pregnancy test  !!!    Specialty Problems     None      . Patient now here in follow up.     Patient's last menstrual period was 2017.      Subjective: Abdominal Pain: negative    Vaginal Bleedingnegative  Menstrual Cycle: normal: negative  Dysmenorrhea:positive:   Dyspareunia:negative  Urinary Symptoms: negative   Vaginal Discharge:{No          Current Outpatient Prescriptions:   •  Progesterone 100 MG Suppos, Insert 1 Suppository in vagina 2 Times a Day., Disp: 28 Suppository, Rfl: 3  •  amoxicillin (AMOXIL) 500 MG Cap, Take 1 Cap by mouth 3 times a day for 10 days., Disp: 30 Cap, Rfl: 0  •  MethylPREDNISolone (MEDROL DOSEPAK) 4 MG Tablet Therapy Pack, Take according to package instructions, Disp: 1 Kit, Rfl: 0  •  lidocaine viscous 2% (XYLOCAINE) 2 % Solution, Take 15 mL, mix with equal amount of water; gargle/spit every 3 hours as needed for pain, Disp: 120 mL, Rfl: 0  •  Prenatal [id number]-[id number] (PRENATE AM) 1 MG Tab, Take 1 Tab by mouth every day., Disp: 90 Tab, Rfl: 3  ROS: no change in ROS since visit of : 2017.  :  Recent Results (from the past 336 hour(s))   HCG QUANTITATIVE    Collection Time: 17  2:17 PM   Result Value Ref Range    Bhcg 5,946.1 (H) 0.0 - 5.0 mIU/mL   PROGESTERONE    Collection Time: 17  2:17 PM   Result Value Ref Range    Progesterone 21.57 ng/mL       Vitals:    17 0907   BP: 120/70   Weight: 96.6 kg (213 lb)     Past Medical History:   Diagnosis Date   • Bronchitis    • Hay fever yearly   • Hemorrhoids    • Pneumonia      PGYN: prev SAB, luteal phase dysfunction  Social History     Social History   • Marital status:      Spouse name: N/A   • Number of children: N/A   • Years of education: N/A     Occupational History   • Not on file.     Social History Main Topics   •  Smoking status: Never Smoker   • Smokeless tobacco: Never Used   • Alcohol use No   • Drug use: No   • Sexual activity: Yes     Partners: Male     Other Topics Concern   • Not on file     Social History Narrative   • No narrative on file     Family History   Problem Relation Age of Onset   • Hypertension Mother    • Diabetes Mother      type 2   • Anemia Other    • Hyperlipidemia Father    • Other Paternal Uncle      obesity   • Hypertension Maternal Grandmother    • Depression Maternal Grandmother    • Diabetes Maternal Grandmother    • Cancer Maternal Grandfather      colon cancer & eye cancer   • Asthma Paternal Grandmother      Past Surgical History:   Procedure Laterality Date   • PRIMARY C SECTION  2012   • APPENDECTOMY  1996     Results for AQUILES CURRY (MRN [id number]) as of 12/12/2017 14:16   Ref. Range 11/21/2017 11:19 11/27/2017 07:08   Progesterone Latest Units: ng/mL 15.6 24.07       Exam: deferred     Ass: early pregnancy           Hx of SAB, and luteal dysfunction             Spent 25 minutes with the patient ; Face to Face, with >50% of this time spent in counseling and coordination of care, surrounding the above mentioned issues as well as: need progesterone and serial labs . apparent borderline levels  with this pregnancy   P. Progesterone /HCG : serially       Progesterone supp 100 mg BID      Follow up : Return visit in 2 week(s)

## 2017-12-14 LAB
HCG INTACT+B SERPL-ACNC: 6220 MIU/ML
PROGEST SERPL-MCNC: 22.4 NG/ML

## 2017-12-19 ENCOUNTER — TELEPHONE (OUTPATIENT)
Dept: OBGYN | Facility: MEDICAL CENTER | Age: 27
End: 2017-12-19

## 2017-12-19 NOTE — TELEPHONE ENCOUNTER
Dr. Matthew I have given the patient the info you gave me on her results and now pt is wondering if she needs another progesterone test to re-check her levels again after the medication has been taken? Please advise? Thank you

## 2017-12-20 ENCOUNTER — GYNECOLOGY VISIT (OUTPATIENT)
Dept: OBGYN | Facility: CLINIC | Age: 27
End: 2017-12-20
Payer: OTHER GOVERNMENT

## 2017-12-20 VITALS — SYSTOLIC BLOOD PRESSURE: 142 MMHG | BODY MASS INDEX: 33.2 KG/M2 | DIASTOLIC BLOOD PRESSURE: 80 MMHG | WEIGHT: 212 LBS

## 2017-12-20 DIAGNOSIS — O20.0 ABORTION, THREATENED, ANTEPARTUM: ICD-10-CM

## 2017-12-20 PROCEDURE — 76817 TRANSVAGINAL US OBSTETRIC: CPT | Performed by: OBSTETRICS & GYNECOLOGY

## 2017-12-20 PROCEDURE — 99213 OFFICE O/P EST LOW 20 MIN: CPT | Performed by: OBSTETRICS & GYNECOLOGY

## 2017-12-22 LAB — PROGEST SERPL-MCNC: 28.8 NG/ML

## 2018-01-11 ENCOUNTER — ROUTINE PRENATAL (OUTPATIENT)
Dept: OBGYN | Facility: CLINIC | Age: 28
End: 2018-01-11
Payer: OTHER GOVERNMENT

## 2018-01-11 VITALS — WEIGHT: 217 LBS | SYSTOLIC BLOOD PRESSURE: 130 MMHG | BODY MASS INDEX: 33.99 KG/M2 | DIASTOLIC BLOOD PRESSURE: 80 MMHG

## 2018-01-11 DIAGNOSIS — O20.0 ABORTION, THREATENED, ANTEPARTUM: ICD-10-CM

## 2018-01-11 PROCEDURE — 99213 OFFICE O/P EST LOW 20 MIN: CPT | Performed by: OBSTETRICS & GYNECOLOGY

## 2018-01-11 PROCEDURE — 76817 TRANSVAGINAL US OBSTETRIC: CPT | Performed by: OBSTETRICS & GYNECOLOGY

## 2018-02-01 NOTE — PROGRESS NOTES
Follow up for Dub. Patient now on TID progesterone :   OB ULTRASOUND LIMITED SUMMARY  First trimester findings: Intrauterine gestational sac seen: yes  Gestational sac summary: fetal pole seen, yolk sac seen, fetal cardiac activity, EGA: 10 weeks + 3 days  Fetal cardiac activity: present, 168 bpm , Positive cardiac and fetal movement   Crown-rump length: consistent with 10 weeks and 3 days  ROSEANNE : dates  2018  ROSEANNE scan 2018      Ass: 10.1 weeks IUP   Threatened    P. Continue TID progesterone       RTC for NOB

## 2018-04-20 ENCOUNTER — HOSPITAL ENCOUNTER (OUTPATIENT)
Dept: LAB | Facility: MEDICAL CENTER | Age: 28
End: 2018-04-20
Attending: OBSTETRICS & GYNECOLOGY
Payer: OTHER GOVERNMENT

## 2018-04-20 LAB
BASOPHILS # BLD AUTO: 0.2 % (ref 0–1.8)
BASOPHILS # BLD: 0.02 K/UL (ref 0–0.12)
EOSINOPHIL # BLD AUTO: 0.12 K/UL (ref 0–0.51)
EOSINOPHIL NFR BLD: 1.3 % (ref 0–6.9)
ERYTHROCYTE [DISTWIDTH] IN BLOOD BY AUTOMATED COUNT: 45.2 FL (ref 35.9–50)
GLUCOSE 1H P 50 G GLC PO SERPL-MCNC: 159 MG/DL (ref 70–139)
HCT VFR BLD AUTO: 35.9 % (ref 37–47)
HGB BLD-MCNC: 12.5 G/DL (ref 12–16)
IMM GRANULOCYTES # BLD AUTO: 0.05 K/UL (ref 0–0.11)
IMM GRANULOCYTES NFR BLD AUTO: 0.6 % (ref 0–0.9)
LYMPHOCYTES # BLD AUTO: 1.87 K/UL (ref 1–4.8)
LYMPHOCYTES NFR BLD: 20.7 % (ref 22–41)
MCH RBC QN AUTO: 30.3 PG (ref 27–33)
MCHC RBC AUTO-ENTMCNC: 34.8 G/DL (ref 33.6–35)
MCV RBC AUTO: 87.1 FL (ref 81.4–97.8)
MONOCYTES # BLD AUTO: 0.34 K/UL (ref 0–0.85)
MONOCYTES NFR BLD AUTO: 3.8 % (ref 0–13.4)
NEUTROPHILS # BLD AUTO: 6.64 K/UL (ref 2–7.15)
NEUTROPHILS NFR BLD: 73.4 % (ref 44–72)
NRBC # BLD AUTO: 0 K/UL
NRBC BLD-RTO: 0 /100 WBC
PLATELET # BLD AUTO: 207 K/UL (ref 164–446)
PMV BLD AUTO: 10.3 FL (ref 9–12.9)
RBC # BLD AUTO: 4.12 M/UL (ref 4.2–5.4)
WBC # BLD AUTO: 9 K/UL (ref 4.8–10.8)

## 2018-04-20 PROCEDURE — 36415 COLL VENOUS BLD VENIPUNCTURE: CPT

## 2018-04-20 PROCEDURE — 82950 GLUCOSE TEST: CPT

## 2018-04-20 PROCEDURE — 85025 COMPLETE CBC W/AUTO DIFF WBC: CPT

## 2020-06-22 ENCOUNTER — HOSPITAL ENCOUNTER (OUTPATIENT)
Dept: HOSPITAL 8 - LAB | Age: 30
Discharge: HOME | End: 2020-06-22
Attending: OBSTETRICS & GYNECOLOGY
Payer: OTHER GOVERNMENT

## 2020-06-22 DIAGNOSIS — O98.513: Primary | ICD-10-CM

## 2020-06-22 DIAGNOSIS — Z3A.49: ICD-10-CM

## 2020-06-22 PROCEDURE — 36415 COLL VENOUS BLD VENIPUNCTURE: CPT

## 2020-06-22 PROCEDURE — 87635 SARS-COV-2 COVID-19 AMP PRB: CPT

## 2023-02-24 ENCOUNTER — APPOINTMENT (OUTPATIENT)
Dept: RADIOLOGY | Facility: IMAGING CENTER | Age: 33
End: 2023-02-24
Attending: PHYSICIAN ASSISTANT
Payer: OTHER GOVERNMENT

## 2023-02-24 ENCOUNTER — OFFICE VISIT (OUTPATIENT)
Dept: URGENT CARE | Facility: CLINIC | Age: 33
End: 2023-02-24
Payer: OTHER GOVERNMENT

## 2023-02-24 ENCOUNTER — APPOINTMENT (OUTPATIENT)
Dept: URGENT CARE | Facility: CLINIC | Age: 33
End: 2023-02-24

## 2023-02-24 VITALS
TEMPERATURE: 98.1 F | HEIGHT: 68 IN | HEART RATE: 82 BPM | BODY MASS INDEX: 33.8 KG/M2 | DIASTOLIC BLOOD PRESSURE: 70 MMHG | WEIGHT: 223 LBS | RESPIRATION RATE: 18 BRPM | OXYGEN SATURATION: 96 % | SYSTOLIC BLOOD PRESSURE: 110 MMHG

## 2023-02-24 DIAGNOSIS — J01.40 ACUTE NON-RECURRENT PANSINUSITIS: ICD-10-CM

## 2023-02-24 DIAGNOSIS — R05.1 ACUTE COUGH: ICD-10-CM

## 2023-02-24 DIAGNOSIS — R42 DIZZINESS: ICD-10-CM

## 2023-02-24 LAB
EXTERNAL QUALITY CONTROL: NORMAL
INT CON NEG: NORMAL
INT CON POS: NORMAL
SARS-COV+SARS-COV-2 AG RESP QL IA.RAPID: NEGATIVE

## 2023-02-24 PROCEDURE — 71046 X-RAY EXAM CHEST 2 VIEWS: CPT | Mod: TC,FY | Performed by: RADIOLOGY

## 2023-02-24 PROCEDURE — 93000 ELECTROCARDIOGRAM COMPLETE: CPT | Performed by: PHYSICIAN ASSISTANT

## 2023-02-24 PROCEDURE — 99203 OFFICE O/P NEW LOW 30 MIN: CPT | Performed by: PHYSICIAN ASSISTANT

## 2023-02-24 PROCEDURE — 87426 SARSCOV CORONAVIRUS AG IA: CPT | Performed by: PHYSICIAN ASSISTANT

## 2023-02-24 RX ORDER — AMOXICILLIN AND CLAVULANATE POTASSIUM 875; 125 MG/1; MG/1
1 TABLET, FILM COATED ORAL 2 TIMES DAILY
Qty: 14 TABLET | Refills: 0 | Status: SHIPPED | OUTPATIENT
Start: 2023-02-24 | End: 2023-03-03

## 2023-02-24 RX ORDER — VALACYCLOVIR HYDROCHLORIDE 1 G/1
TABLET, FILM COATED ORAL
COMMUNITY
Start: 2023-01-09

## 2023-02-24 RX ORDER — NORETHINDRONE ACETATE AND ETHINYL ESTRADIOL 1MG-20(24)
1 KIT ORAL
COMMUNITY
Start: 2023-02-11

## 2023-02-24 RX ORDER — HYDROXYZINE HYDROCHLORIDE 10 MG/1
TABLET, FILM COATED ORAL
COMMUNITY
Start: 2023-02-22

## 2023-02-24 ASSESSMENT — ENCOUNTER SYMPTOMS
COUGH: 1
FEVER: 0
SPUTUM PRODUCTION: 1
DIZZINESS: 1

## 2023-02-24 NOTE — PROGRESS NOTES
Subjective:   Diane Duke is a 32 y.o. female who presents today with   Chief Complaint   Patient presents with    Dizziness     X 5 days, dizziness, tight chest, foggy, tired, coughing green mucus.     Dizziness  This is a new problem. The current episode started in the past 7 days. The problem occurs constantly. The problem has been unchanged. Associated symptoms include chest pain (tightness), congestion and coughing. Pertinent negatives include no fever. She has tried nothing for the symptoms. The treatment provided no relief.   Patient states she has been coughing up green mucus with her cough.  She states she has felt somewhat lightheaded/dizzy as well as having some intermittent chest tightness.     PMH:  has a past medical history of Bronchitis (2013), Hay fever (yearly), Hemorrhoids (2015), and Pneumonia.    She has no past medical history of Diabetes (HCC), Headache(784.0), or Hypertension.  MEDS:   Current Outpatient Medications:     BLISOVI 24 FE 1-20 MG-MCG(24) Tab, Take 1 Tablet by mouth every day., Disp: , Rfl:     hydrOXYzine HCl (ATARAX) 10 MG Tab, , Disp: , Rfl:     valacyclovir (VALTREX) 1 GM Tab, TAKE 2 TABLETS BY MOUTH TWICE DAILY FOR 1 DAY, Disp: , Rfl:     amoxicillin-clavulanate (AUGMENTIN) 875-125 MG Tab, Take 1 Tablet by mouth 2 times a day for 7 days., Disp: 14 Tablet, Rfl: 0  ALLERGIES:   Allergies   Allergen Reactions    Grass Pollen(K-O-R-T-Swt Cullen)     Sulfa Drugs      SURGHX:   Past Surgical History:   Procedure Laterality Date    PRIMARY C SECTION  2012    APPENDECTOMY  1996     SOCHX:  reports that she has never smoked. She has never used smokeless tobacco. She reports that she does not drink alcohol and does not use drugs.  FH: Reviewed with patient, not pertinent to this visit.      Review of Systems   Constitutional:  Negative for fever.   HENT:  Positive for congestion.    Respiratory:  Positive for cough and sputum production.    Cardiovascular:  Positive for chest pain  "(tightness).   Neurological:  Positive for dizziness.      Objective:   /70   Pulse 82   Temp 36.7 °C (98.1 °F) (Temporal)   Resp 18   Ht 1.727 m (5' 8\")   Wt 101 kg (223 lb)   SpO2 96%   BMI 33.91 kg/m²   Physical Exam  Vitals and nursing note reviewed.   Constitutional:       General: She is not in acute distress.     Appearance: Normal appearance. She is well-developed. She is not ill-appearing or toxic-appearing.   HENT:      Head: Normocephalic and atraumatic.      Right Ear: Hearing normal.      Left Ear: Hearing normal.      Nose: Mucosal edema and congestion present.      Right Sinus: Maxillary sinus tenderness and frontal sinus tenderness present.      Left Sinus: Maxillary sinus tenderness and frontal sinus tenderness present.      Mouth/Throat:      Mouth: Mucous membranes are moist.      Pharynx: No oropharyngeal exudate or posterior oropharyngeal erythema.   Cardiovascular:      Rate and Rhythm: Normal rate and regular rhythm.      Heart sounds: Normal heart sounds.   Pulmonary:      Effort: Pulmonary effort is normal.      Breath sounds: Normal breath sounds. No stridor. No wheezing, rhonchi or rales.   Musculoskeletal:      Comments: Normal movement in all 4 extremities   Skin:     General: Skin is warm and dry.   Neurological:      Mental Status: She is alert.      Coordination: Coordination normal.   Psychiatric:         Mood and Affect: Mood normal.     DX CHEST  FINDINGS:  The soft tissues and bony structures are unremarkable.     The heart and mediastinal structures are within normal limits.     Pulmonary vascularity is normal.     The lung fields are clear.     There is no effusion or pneumothorax.     IMPRESSION:     No acute cardiopulmonary disease evident.    EKG  Normal sinus rhythm with rate of 71.  No acute ST wave abnormalities appreciated at this time.  No previous EKG to compare with.    COVID -    Assessment/Plan:   Assessment    1. Acute non-recurrent pansinusitis  - " amoxicillin-clavulanate (AUGMENTIN) 875-125 MG Tab; Take 1 Tablet by mouth 2 times a day for 7 days.  Dispense: 14 Tablet; Refill: 0    2. Acute cough  - DX-CHEST-2 VIEWS; Future  - POCT SARS-COV Antigen ADRIANA (Symptomatic only)    3. Dizziness  - EKG - Clinic Performed    Other orders  - BLISOVI 24 FE 1-20 MG-MCG(24) Tab; Take 1 Tablet by mouth every day.  - hydrOXYzine HCl (ATARAX) 10 MG Tab  - valacyclovir (VALTREX) 1 GM Tab; TAKE 2 TABLETS BY MOUTH TWICE DAILY FOR 1 DAY  Symptoms and presentation at this time are consistent with sinus infection as well as bacterial respiratory infection and I recommend treating with antibiotics at this time.  Recommend use of over-the-counter symptomatic relief as well.  No acute concerning findings on EKG or x-ray at this time.    Differential diagnosis, natural history, supportive care, and indications for immediate follow-up discussed.   Patient given instructions and understanding of medications and treatment.    If not improving in 3-5 days, F/U with PCP or return to  if symptoms worsen.    Patient agreeable to plan.      Please note that this dictation was created using voice recognition software. I have made every reasonable attempt to correct obvious errors, but I expect that there are errors of grammar and possibly content that I did not discover before finalizing the note.    Fernando Willson PA-C

## 2023-11-23 ENCOUNTER — APPOINTMENT (OUTPATIENT)
Dept: RADIOLOGY | Facility: MEDICAL CENTER | Age: 33
End: 2023-11-23
Attending: EMERGENCY MEDICINE
Payer: OTHER GOVERNMENT

## 2023-11-23 ENCOUNTER — HOSPITAL ENCOUNTER (EMERGENCY)
Facility: MEDICAL CENTER | Age: 33
End: 2023-11-23
Attending: EMERGENCY MEDICINE
Payer: OTHER GOVERNMENT

## 2023-11-23 VITALS
WEIGHT: 219.8 LBS | DIASTOLIC BLOOD PRESSURE: 78 MMHG | TEMPERATURE: 98 F | HEIGHT: 68 IN | SYSTOLIC BLOOD PRESSURE: 122 MMHG | RESPIRATION RATE: 18 BRPM | OXYGEN SATURATION: 96 % | BODY MASS INDEX: 33.31 KG/M2 | HEART RATE: 89 BPM

## 2023-11-23 DIAGNOSIS — J45.21 MILD INTERMITTENT REACTIVE AIRWAY DISEASE WITH ACUTE EXACERBATION: Primary | ICD-10-CM

## 2023-11-23 DIAGNOSIS — R06.2 WHEEZING ON EXPIRATION: ICD-10-CM

## 2023-11-23 LAB
ALBUMIN SERPL BCP-MCNC: 4.4 G/DL (ref 3.2–4.9)
ALBUMIN/GLOB SERPL: 1.6 G/DL
ALP SERPL-CCNC: 73 U/L (ref 30–99)
ALT SERPL-CCNC: 14 U/L (ref 2–50)
ANION GAP SERPL CALC-SCNC: 13 MMOL/L (ref 7–16)
AST SERPL-CCNC: 17 U/L (ref 12–45)
BASOPHILS # BLD AUTO: 0.9 % (ref 0–1.8)
BASOPHILS # BLD: 0.09 K/UL (ref 0–0.12)
BILIRUB SERPL-MCNC: 0.2 MG/DL (ref 0.1–1.5)
BUN SERPL-MCNC: 10 MG/DL (ref 8–22)
CALCIUM ALBUM COR SERPL-MCNC: 8.9 MG/DL (ref 8.5–10.5)
CALCIUM SERPL-MCNC: 9.2 MG/DL (ref 8.4–10.2)
CHLORIDE SERPL-SCNC: 106 MMOL/L (ref 96–112)
CO2 SERPL-SCNC: 23 MMOL/L (ref 20–33)
CREAT SERPL-MCNC: 0.75 MG/DL (ref 0.5–1.4)
D DIMER PPP IA.FEU-MCNC: <0.27 UG/ML (FEU) (ref 0–0.5)
EKG IMPRESSION: NORMAL
EOSINOPHIL # BLD AUTO: 0.41 K/UL (ref 0–0.51)
EOSINOPHIL NFR BLD: 4.3 % (ref 0–6.9)
ERYTHROCYTE [DISTWIDTH] IN BLOOD BY AUTOMATED COUNT: 43.7 FL (ref 35.9–50)
FLUAV RNA SPEC QL NAA+PROBE: NEGATIVE
FLUBV RNA SPEC QL NAA+PROBE: NEGATIVE
GFR SERPLBLD CREATININE-BSD FMLA CKD-EPI: 107 ML/MIN/1.73 M 2
GLOBULIN SER CALC-MCNC: 2.7 G/DL (ref 1.9–3.5)
GLUCOSE SERPL-MCNC: 99 MG/DL (ref 65–99)
HCT VFR BLD AUTO: 43.5 % (ref 37–47)
HGB BLD-MCNC: 15.5 G/DL (ref 12–16)
IMM GRANULOCYTES # BLD AUTO: 0.03 K/UL (ref 0–0.11)
IMM GRANULOCYTES NFR BLD AUTO: 0.3 % (ref 0–0.9)
LYMPHOCYTES # BLD AUTO: 3.88 K/UL (ref 1–4.8)
LYMPHOCYTES NFR BLD: 40.9 % (ref 22–41)
MCH RBC QN AUTO: 31.3 PG (ref 27–33)
MCHC RBC AUTO-ENTMCNC: 35.6 G/DL (ref 32.2–35.5)
MCV RBC AUTO: 87.9 FL (ref 81.4–97.8)
MONOCYTES # BLD AUTO: 0.52 K/UL (ref 0–0.85)
MONOCYTES NFR BLD AUTO: 5.5 % (ref 0–13.4)
NEUTROPHILS # BLD AUTO: 4.55 K/UL (ref 1.82–7.42)
NEUTROPHILS NFR BLD: 48.1 % (ref 44–72)
NRBC # BLD AUTO: 0 K/UL
NRBC BLD-RTO: 0 /100 WBC (ref 0–0.2)
PLATELET # BLD AUTO: 265 K/UL (ref 164–446)
PMV BLD AUTO: 9.5 FL (ref 9–12.9)
POTASSIUM SERPL-SCNC: 3.9 MMOL/L (ref 3.6–5.5)
PROT SERPL-MCNC: 7.1 G/DL (ref 6–8.2)
RBC # BLD AUTO: 4.95 M/UL (ref 4.2–5.4)
RSV RNA SPEC QL NAA+PROBE: NEGATIVE
SARS-COV-2 RNA RESP QL NAA+PROBE: NOTDETECTED
SODIUM SERPL-SCNC: 142 MMOL/L (ref 135–145)
SPECIMEN SOURCE: NORMAL
WBC # BLD AUTO: 9.5 K/UL (ref 4.8–10.8)

## 2023-11-23 PROCEDURE — 36415 COLL VENOUS BLD VENIPUNCTURE: CPT

## 2023-11-23 PROCEDURE — 94760 N-INVAS EAR/PLS OXIMETRY 1: CPT

## 2023-11-23 PROCEDURE — 0241U HCHG SARS-COV-2 COVID-19 NFCT DS RESP RNA 4 TRGT MIC: CPT

## 2023-11-23 PROCEDURE — 85379 FIBRIN DEGRADATION QUANT: CPT

## 2023-11-23 PROCEDURE — 700111 HCHG RX REV CODE 636 W/ 250 OVERRIDE (IP): Mod: JZ | Performed by: EMERGENCY MEDICINE

## 2023-11-23 PROCEDURE — C9803 HOPD COVID-19 SPEC COLLECT: HCPCS | Performed by: EMERGENCY MEDICINE

## 2023-11-23 PROCEDURE — 700101 HCHG RX REV CODE 250: Performed by: EMERGENCY MEDICINE

## 2023-11-23 PROCEDURE — 71045 X-RAY EXAM CHEST 1 VIEW: CPT

## 2023-11-23 PROCEDURE — 96374 THER/PROPH/DIAG INJ IV PUSH: CPT

## 2023-11-23 PROCEDURE — 80053 COMPREHEN METABOLIC PANEL: CPT

## 2023-11-23 PROCEDURE — 85025 COMPLETE CBC W/AUTO DIFF WBC: CPT

## 2023-11-23 PROCEDURE — 94640 AIRWAY INHALATION TREATMENT: CPT

## 2023-11-23 PROCEDURE — 99284 EMERGENCY DEPT VISIT MOD MDM: CPT

## 2023-11-23 PROCEDURE — 93005 ELECTROCARDIOGRAM TRACING: CPT | Performed by: EMERGENCY MEDICINE

## 2023-11-23 RX ORDER — PREDNISONE 20 MG/1
40 TABLET ORAL DAILY
Qty: 10 TABLET | Refills: 0 | Status: SHIPPED | OUTPATIENT
Start: 2023-11-23 | End: 2023-11-28

## 2023-11-23 RX ORDER — IPRATROPIUM BROMIDE AND ALBUTEROL SULFATE 2.5; .5 MG/3ML; MG/3ML
3 SOLUTION RESPIRATORY (INHALATION)
Status: DISCONTINUED | OUTPATIENT
Start: 2023-11-23 | End: 2023-11-24 | Stop reason: HOSPADM

## 2023-11-23 RX ORDER — METHYLPREDNISOLONE SODIUM SUCCINATE 125 MG/2ML
125 INJECTION, POWDER, LYOPHILIZED, FOR SOLUTION INTRAMUSCULAR; INTRAVENOUS ONCE
Status: COMPLETED | OUTPATIENT
Start: 2023-11-23 | End: 2023-11-23

## 2023-11-23 RX ADMIN — IPRATROPIUM BROMIDE AND ALBUTEROL SULFATE 3 ML: 2.5; .5 SOLUTION RESPIRATORY (INHALATION) at 21:00

## 2023-11-23 RX ADMIN — METHYLPREDNISOLONE SODIUM SUCCINATE 125 MG: 125 INJECTION, POWDER, FOR SOLUTION INTRAMUSCULAR; INTRAVENOUS at 21:15

## 2023-11-24 NOTE — ED TRIAGE NOTES
Chief Complaint   Patient presents with    Shortness of Breath     Reports using albuterol at home with minimal relief. States SOB since October. Reports also dry cough and congestion.     Speaking in complete sentences. Resp e/u.

## 2023-11-24 NOTE — ED NOTES
Discharged in stable condition, alert and oriented, ambulatory, accompanied by bf. Prescribed medication and follow up appointment instructed. Health education imparted. Instructed to come back once symptoms worsened. Pt verbalized understanding of the information given. Removed IV line and applied pressure.

## 2023-11-24 NOTE — ED PROVIDER NOTES
ED Provider Note    Scribed for Fernie Van by Fernie Van. 11/23/2023  8:35 PM    Primary care provider: Roxane Da Silva D.O.  Means of arrival: private vehicle   History obtained from: Patient  History limited by: None    CHIEF COMPLAINT  Chief Complaint   Patient presents with    Shortness of Breath     Reports using albuterol at home with minimal relief. States SOB since October. Reports also dry cough and congestion.       EXTERNAL RECORDS REVIEWED  Outpatient Notes patient was seen in urgent care in February she was diagnosed with similar symptoms, started on prednisone and an albuterol inhaler    HPI/ROS  LIMITATION TO HISTORY   Select: : None  OUTSIDE HISTORIAN(S):  Family  bedside helps provide collateral formation regarding patient's presentation here tonight    HPI  Diane Duke is a 33 y.o. female who presents to the Emergency Department with a history of recurrent URI infections, once in February once in October.  She has had some intermittent shortness of breath since that time.  She underwent antibiotics, prednisone treatment and has been using albuterol inhaler prescribed since October.  She has been using it with increased frequency over the last 2 weeks.  She feels like she cannot go to the gym, go for runs without becoming extremely short of breath.  Tonight she had audible wheezing she states.  Denies chest pain.  No nausea or vomiting.  Occasional cough of white sputum.  No fevers.  No diarrhea or constipation.  No abdominal pain.  Otherwise healthy.  Denies all drug or tobacco use.  Has an IUD in place, does not think she is pregnant.  No known sick contacts, no flu COVID or RSV exposures or symptoms.    REVIEW OF SYSTEMS  As above, all other systems reviewed and are negative.   See HPI for further details.     PAST MEDICAL HISTORY   has a past medical history of Bronchitis (2013), Hay fever (yearly), Hemorrhoids (2015), and Pneumonia.  SURGICAL HISTORY   has a past  "surgical history that includes appendectomy (1996) and primary c section (2012).  SOCIAL HISTORY  Social History     Tobacco Use    Smoking status: Never    Smokeless tobacco: Never   Vaping Use    Vaping Use: Never used   Substance Use Topics    Alcohol use: No    Drug use: No      Social History     Substance and Sexual Activity   Drug Use No     FAMILY HISTORY  Family History   Problem Relation Age of Onset    Hypertension Mother     Diabetes Mother         type 2    Anemia Other     Hyperlipidemia Father     Other Paternal Uncle         obesity    Hypertension Maternal Grandmother     Depression Maternal Grandmother     Diabetes Maternal Grandmother     Cancer Maternal Grandfather         colon cancer & eye cancer    Asthma Paternal Grandmother      CURRENT MEDICATIONS  Home Medications       Reviewed by Penelope Manzano R.N. (Registered Nurse) on 11/23/23 at 2023  Med List Status: Not Addressed     Medication Last Dose Status   BLISOVI 24 FE 1-20 MG-MCG(24) Tab  Active   hydrOXYzine HCl (ATARAX) 10 MG Tab  Active   valacyclovir (VALTREX) 1 GM Tab  Active                  ALLERGIES  Allergies   Allergen Reactions    Grass Pollen(K-O-R-T-Swt Cullen)     Sulfa Drugs        PHYSICAL EXAM    VITAL SIGNS:   Vitals:    11/23/23 2020 11/23/23 2100 11/23/23 2122 11/23/23 2151   BP: 118/86  112/82 130/76   Pulse: 98  92 92   Resp: 20 18     Temp: 36.3 °C (97.3 °F)      TempSrc: Temporal      SpO2: 97%  94% 95%   Weight: 99.7 kg (219 lb 12.8 oz)      Height: 1.727 m (5' 8\")        Vitals: My interpretation: normotensive, not tachycardic, afebrile, not hypoxic    Reinterpretation of vitals: Unchanged and unremarkable    Cardiac Monitor Interpretation: The cardiac monitor revealed normal Sinus Rhythm as interpreted by me. The cardiac monitor was ordered secondary to the patient's history of shortness of breath and to monitor for dysrhythmia and/or tachycardia.    PE:   Gen: sitting comfortably, speaking clearly, appears in " no acute distress   ENT: Mucous membranes moist, posterior pharynx clear, uvula midline, nares patent bilaterally   Neck: Supple, FROM  Pulmonary: End expiratory wheezes in all fields. No tachypnea  CV:  RRR, no murmur appreciated, pulses 2+ in both upper and lower extremities  Abdomen: soft, NT/ND; no rebound/guarding  : no CVA or suprapubic tenderness   Neuro: A&Ox4 (person, place, time, situation), speech fluent, gait steady, no focal deficits appreciated  Skin: No rash or lesions.  No pallor or jaundice.  No cyanosis.  Warm and dry.     DIAGNOSTIC STUDIES / PROCEDURES    LABS  Results for orders placed or performed during the hospital encounter of 11/23/23   CoV-2, Flu A/B, And RSV by PCR ([employer])    Specimen: Respirate   Result Value Ref Range    Influenza virus A RNA Negative Negative    Influenza virus B, PCR Negative Negative    RSV, PCR Negative Negative    SARS-CoV-2 by PCR NotDetected     SARS-CoV-2 Source NP Swab    CBC WITH DIFFERENTIAL   Result Value Ref Range    WBC 9.5 4.8 - 10.8 K/uL    RBC 4.95 4.20 - 5.40 M/uL    Hemoglobin 15.5 12.0 - 16.0 g/dL    Hematocrit 43.5 37.0 - 47.0 %    MCV 87.9 81.4 - 97.8 fL    MCH 31.3 27.0 - 33.0 pg    MCHC 35.6 (H) 32.2 - 35.5 g/dL    RDW 43.7 35.9 - 50.0 fL    Platelet Count 265 164 - 446 K/uL    MPV 9.5 9.0 - 12.9 fL    Neutrophils-Polys 48.10 44.00 - 72.00 %    Lymphocytes 40.90 22.00 - 41.00 %    Monocytes 5.50 0.00 - 13.40 %    Eosinophils 4.30 0.00 - 6.90 %    Basophils 0.90 0.00 - 1.80 %    Immature Granulocytes 0.30 0.00 - 0.90 %    Nucleated RBC 0.00 0.00 - 0.20 /100 WBC    Neutrophils (Absolute) 4.55 1.82 - 7.42 K/uL    Lymphs (Absolute) 3.88 1.00 - 4.80 K/uL    Monos (Absolute) 0.52 0.00 - 0.85 K/uL    Eos (Absolute) 0.41 0.00 - 0.51 K/uL    Baso (Absolute) 0.09 0.00 - 0.12 K/uL    Immature Granulocytes (abs) 0.03 0.00 - 0.11 K/uL    NRBC (Absolute) 0.00 K/uL   COMP METABOLIC PANEL   Result Value Ref Range    Sodium 142 135 - 145 mmol/L    Potassium  3.9 3.6 - 5.5 mmol/L    Chloride 106 96 - 112 mmol/L    Co2 23 20 - 33 mmol/L    Anion Gap 13.0 7.0 - 16.0    Glucose 99 65 - 99 mg/dL    Bun 10 8 - 22 mg/dL    Creatinine 0.75 0.50 - 1.40 mg/dL    Calcium 9.2 8.4 - 10.2 mg/dL    Correct Calcium 8.9 8.5 - 10.5 mg/dL    AST(SGOT) 17 12 - 45 U/L    ALT(SGPT) 14 2 - 50 U/L    Alkaline Phosphatase 73 30 - 99 U/L    Total Bilirubin 0.2 0.1 - 1.5 mg/dL    Albumin 4.4 3.2 - 4.9 g/dL    Total Protein 7.1 6.0 - 8.2 g/dL    Globulin 2.7 1.9 - 3.5 g/dL    A-G Ratio 1.6 g/dL   D-DIMER   Result Value Ref Range    D-Dimer <0.27 0.00 - 0.50 ug/mL (FEU)   ESTIMATED GFR   Result Value Ref Range    GFR (CKD-EPI) 107 >60 mL/min/1.73 m 2      All labs reviewed by me. Labs were compared to prior labs if they were available. Significant for negative flu, COVID, RSV, no leukocytosis, no anemia, normal electrolytes, normal renal function, normal liver enzymes, normal bilirubin, D-dimer negative.    RADIOLOGY  I have independently interpreted the diagnostic imaging associated with this visit and am waiting the final reading from the radiologist.   My preliminary interpretation is a follows: No obvious focal consolidation or cardiomegaly present on my independent evaluation  Radiologist interpretation is as follows:  DX-CHEST-PORTABLE (1 VIEW)   Final Result      1.  There is no acute cardiopulmonary process.        COURSE & MEDICAL DECISION MAKING  Nursing notes, VS, PMSFHx, labs, imaging, EKG reviewed in chart.    ED Observation Status? No; Patient does not meet criteria for ED Observation.     Ddx: Pneumonia, COPD, asthma, URI, reactive airway disease    MDM: 8:35 PM Diane Duke is a 33 y.o. female who presented with persistent symptoms of wheezing and feeling short of breath for the past few weeks.  This is been ongoing intermittently since February.  She underwent antibiotics, prednisone and albuterol inhaler diagnosed and prescribed in October of this year.  She is been using the  "albuterol inhaler with increased frequency over the past 7 to 8 days.  Today she tried to go for a run with her family and felt she would only go quarter mile before she was \"sucking air\" and extremely wheezy.  No formal diagnosis of reactive airway disease but does feel improvement with albuterol inhaler.  She denies chest pain or other concomitant symptoms.  Has an occasional cough that is productive of white sputum.  No tobacco alcohol or drug use.  Arrives here with her  who provides collateral formation.  Patient's vital signs unremarkable she is not hypoxic, afebrile, normotensive.  Her physical exam shows no respiratory distress although she does have some very mild end expiratory wheezes in all fields.  An IV was placed, Solu-Medrol administered, 125 mg, a duo nebulizer ordered, chest x-ray and blood work ordered including D-dimer to rule out pulmonary embolism.  Chest x-ray done and no focal consolidative process or cardiomegaly appreciated on my independent interpretation, radiologist agreesAll labs reviewed by me. Labs were compared to prior labs if they were available. Significant for negative flu, COVID, RSV, no leukocytosis, no anemia, normal electrolytes, normal renal function, normal liver enzymes, normal bilirubin, D-dimer negative.  Patient feels improved after treatment here in the emergency department with improved lung sounds after breathing treatment and Solu-Medrol.  She is in no respiratory distress.  She has normal vital signs at time of discharge.  Discussed all of her negative findings with her and her  at bedside.  I do recommend she takes a 5-day course of steroids which were sent to the pharmacy for her.  I recommended following up with her primary team for more formal diagnosis of possible reactive airway disease, asthma or seasonal allergies and other treatment options available to her.  She verbalized understanding all of this and is amenable.    ADDITIONAL PROBLEM LIST " AND DISPOSITION    I have discussed management of the patient with the following physicians and JORGE's: None    Discussion of management with other QHP or appropriate source(s): None     Escalation of care considered, and ultimately not performed:acute inpatient care management, however at this time, the patient is most appropriate for outpatient management    Barriers to care at this time, including but not limited to:  None .     Decision tools and prescription drugs considered including, but not limited to:  Prednisone prescription .    FINAL IMPRESSION  1. Mild intermittent reactive airway disease with acute exacerbation Acute   2. Wheezing on expiration Acute      The note accurately reflects work and decisions made by me.  Fernie Van  11/23/2023  8:35 PM

## 2023-11-24 NOTE — DISCHARGE INSTRUCTIONS
I think it is likely you have reactive airway disease, which can sometimes also include asthma.  He did have wheezing on exam today but improved with a duo nebulizer and Solu-Medrol steroid we gave you.  I do want you to follow-up with your primary doctor for more formal pulmonary function testing and further evaluation and consider other treatment options as there are many that may be helpful for you.  I do want you to use your inhaler as needed.  I sent a prescription for prednisone for you to the pharmacy to take 20 mg in the morning and 20 understanding for the next 5 days.  If any worsening symptoms or difficulty breathing, please return to the ED.  Thank you for coming today.

## 2024-06-20 ENCOUNTER — HOSPITAL ENCOUNTER (OUTPATIENT)
Dept: RADIOLOGY | Facility: MEDICAL CENTER | Age: 34
End: 2024-06-20
Attending: NURSE PRACTITIONER
Payer: OTHER GOVERNMENT

## 2024-06-20 DIAGNOSIS — M79.672 LEFT FOOT PAIN: ICD-10-CM

## 2024-06-20 PROCEDURE — 73630 X-RAY EXAM OF FOOT: CPT | Mod: LT

## 2024-12-22 ENCOUNTER — OFFICE VISIT (OUTPATIENT)
Dept: URGENT CARE | Facility: CLINIC | Age: 34
End: 2024-12-22
Payer: OTHER GOVERNMENT

## 2024-12-22 VITALS
TEMPERATURE: 97.1 F | RESPIRATION RATE: 16 BRPM | SYSTOLIC BLOOD PRESSURE: 110 MMHG | BODY MASS INDEX: 34.25 KG/M2 | HEIGHT: 68 IN | OXYGEN SATURATION: 95 % | WEIGHT: 226 LBS | HEART RATE: 105 BPM | DIASTOLIC BLOOD PRESSURE: 70 MMHG

## 2024-12-22 DIAGNOSIS — R68.89 FLU-LIKE SYMPTOMS: ICD-10-CM

## 2024-12-22 DIAGNOSIS — R05.1 ACUTE COUGH: ICD-10-CM

## 2024-12-22 DIAGNOSIS — J02.0 PHARYNGITIS DUE TO STREPTOCOCCUS SPECIES: Primary | ICD-10-CM

## 2024-12-22 PROBLEM — J45.909 ASTHMA: Status: ACTIVE | Noted: 2023-11-27

## 2024-12-22 PROBLEM — B00.1 HERPES LABIALIS: Status: ACTIVE | Noted: 2023-06-06

## 2024-12-22 LAB
FLUAV RNA SPEC QL NAA+PROBE: NEGATIVE
FLUBV RNA SPEC QL NAA+PROBE: NEGATIVE
RSV RNA SPEC QL NAA+PROBE: NEGATIVE
S PYO DNA SPEC NAA+PROBE: DETECTED
SARS-COV-2 RNA RESP QL NAA+PROBE: NEGATIVE

## 2024-12-22 PROCEDURE — 3074F SYST BP LT 130 MM HG: CPT

## 2024-12-22 PROCEDURE — 0241U POCT CEPHEID COV-2, FLU A/B, RSV - PCR: CPT

## 2024-12-22 PROCEDURE — 99213 OFFICE O/P EST LOW 20 MIN: CPT | Mod: 25

## 2024-12-22 PROCEDURE — 3078F DIAST BP <80 MM HG: CPT

## 2024-12-22 PROCEDURE — 87651 STREP A DNA AMP PROBE: CPT

## 2024-12-22 RX ORDER — MONTELUKAST SODIUM 10 MG/1
1 TABLET ORAL
COMMUNITY
Start: 2024-12-20

## 2024-12-22 RX ORDER — DEXAMETHASONE SODIUM PHOSPHATE 10 MG/ML
10 INJECTION INTRAMUSCULAR; INTRAVENOUS ONCE
Status: COMPLETED | OUTPATIENT
Start: 2024-12-22 | End: 2024-12-22

## 2024-12-22 RX ORDER — ALBUTEROL SULFATE 90 UG/1
2 INHALANT RESPIRATORY (INHALATION) EVERY 4 HOURS
COMMUNITY
Start: 2023-10-24

## 2024-12-22 RX ORDER — AMOXICILLIN 500 MG/1
500 CAPSULE ORAL 2 TIMES DAILY
Qty: 20 CAPSULE | Refills: 0 | Status: SHIPPED | OUTPATIENT
Start: 2024-12-22 | End: 2025-01-01

## 2024-12-22 RX ADMIN — DEXAMETHASONE SODIUM PHOSPHATE 10 MG: 10 INJECTION INTRAMUSCULAR; INTRAVENOUS at 11:49

## 2024-12-22 ASSESSMENT — ENCOUNTER SYMPTOMS
DOUBLE VISION: 0
EYE PAIN: 0
WEIGHT LOSS: 0
SPUTUM PRODUCTION: 1
WEAKNESS: 0
NAUSEA: 0
STRIDOR: 0
EYE REDNESS: 0
COUGH: 0
HEARTBURN: 0
MYALGIAS: 1
WHEEZING: 0
MYALGIAS: 0
VOMITING: 0
DIZZINESS: 0
SHORTNESS OF BREATH: 0
HEMOPTYSIS: 0
DIAPHORESIS: 0
NERVOUS/ANXIOUS: 0
CHILLS: 0
SPEECH CHANGE: 0
HEADACHES: 1
FEVER: 1
SENSORY CHANGE: 0
PALPITATIONS: 0
SORE THROAT: 1
COUGH: 1
BLURRED VISION: 0
EYE DISCHARGE: 0
SINUS PAIN: 0
ABDOMINAL PAIN: 0
PHOTOPHOBIA: 0
CHILLS: 1
DEPRESSION: 0
BRUISES/BLEEDS EASILY: 0
FEVER: 0
FOCAL WEAKNESS: 0
DIARRHEA: 0
HEADACHES: 0

## 2024-12-22 ASSESSMENT — FIBROSIS 4 INDEX: FIB4 SCORE: 0.6

## 2024-12-22 NOTE — PROGRESS NOTES
Subjective:   Diane Duke is a 34 y.o. female who presents for Pharyngitis (X 2 days)          I introduced myself to the patient and informed them that I am a Family Nurse Practitioner.    HPI:Diane is a 34 year-old female  ***comes in today c/o pharyngitis. Onset was *** days ago.  Patient describes symptoms as constant. They describe the pain as sharp, scratchy. Aggravating factors include eating, drinking, swallowing. Relieving factors include cold drinks. Treatments tried at home include Tylenol with minimal relief. They describe their symptoms as moderate.  Patient denies any known exposure to COVID, flu, RSV, strep, denies any sick contacts.  States the did ***not get a flu shot this season, vaccinated against COVID times***      Review of Systems   Constitutional:  Negative for chills, fever, malaise/fatigue and weight loss.   HENT:  Positive for sore throat. Negative for congestion, ear pain and sinus pain.    Eyes:  Negative for blurred vision, double vision, pain, discharge and redness.   Respiratory:  Negative for cough, shortness of breath, wheezing and stridor.    Cardiovascular:  Negative for chest pain, palpitations and leg swelling.   Gastrointestinal:  Negative for abdominal pain, diarrhea, heartburn, nausea and vomiting.   Genitourinary:  Negative for dysuria, frequency and urgency.   Musculoskeletal:  Negative for joint pain and myalgias.   Skin:  Negative for rash.   Neurological:  Negative for dizziness, sensory change, speech change, weakness and headaches.   Endo/Heme/Allergies:  Does not bruise/bleed easily.   Psychiatric/Behavioral:  Negative for depression. The patient is not nervous/anxious.        Medications: albuterol Aers  Blisovi 24 Fe Tabs  hydrOXYzine HCl Tabs  montelukast Tabs  valacyclovir Tabs     Allergies: Grass pollen(k-o-r-t-swt whitley) and Sulfa drugs    Problem List: does not have any pertinent problems on file.    Surgical History:  Past Surgical History:   Procedure  "Laterality Date    PRIMARY C SECTION  2012    APPENDECTOMY  1996       Past Social Hx:   reports that she has never smoked. She has never used smokeless tobacco. She reports that she does not drink alcohol and does not use drugs.     Past Family Hx:   family history includes Anemia in an other family member; Asthma in her paternal grandmother; Cancer in her maternal grandfather; Depression in her maternal grandmother; Diabetes in her maternal grandmother and mother; Hyperlipidemia in her father; Hypertension in her maternal grandmother and mother; Other in her paternal uncle.     Problem list, medications, and allergies reviewed by myself today in Epic.   I have documented what I find to be significant in regards to past medical, social, family and surgical history  in my HPI or under PMH/PSH/FH review section, otherwise it is noncontributory     Objective:     /70 (BP Location: Left arm, Patient Position: Sitting, BP Cuff Size: Large adult)   Pulse (!) 105   Temp 36.2 °C (97.1 °F)   Resp 16   Ht 1.727 m (5' 8\")   Wt 103 kg (226 lb)   SpO2 95%   BMI 34.36 kg/m²     During this visit, appropriate PPE was worn, and hand hygiene was performed.    Physical Exam    Assessment/Plan:     Diagnosis and associated orders:     No diagnosis found.   Comments/MDM:     ***       Pt is clinically stable at today's acute urgent care visit. Vital signs are normal and reassuring.  No acute distress noted. Appropriate for outpatient management at this time.        I personally reviewed prior external notes and test results pertinent to today's visit.  I have independently reviewed and interpreted all diagnostics ordered during this urgent care acute visit.        Please note that this dictation was created using voice recognition software. I have made a reasonable attempt to correct obvious errors, but I expect that there are errors of grammar and possibly content that I did not discover before finalizing the " note.    This note was electronically signed by SREEDHAR Collazo, NAYE, DYAN

## 2024-12-22 NOTE — PROGRESS NOTES
Subjective:   Diane Duke is a 34 y.o. female who presents for Pharyngitis (X 2 days)          I introduced myself to the patient and informed them that I am a Family Nurse Practitioner.    HPI:Diane a 34 year-old female  who comes in today c/o fever, chills, cough, nasal congestion, runny nose, malaise, body aches,  pharyngitis.  Onset was 2 days ago. Patient describes symptoms as constant. They describe the pain as headache, body aches,  sharp and scratchy throat. Aggravating factors include worse at night, cough is worse when they lay down, throat pain is exacerbated by eating, drinking, swallowing. Relieving factors include none. Treatments tried at home include  OTC Cold and Flu medicine with fair effect.  They describe their symptoms as moderate. Denies any known exposure to Covid, Flu, RSV, strep. Denies anyone else is sick at home presently. States they did not get a flu shot this season, states vaccinated against Covid x 3.  Denies breastfeeding, denies any chance she could be pregnant.       Review of Systems   Constitutional:  Positive for chills, fever and malaise/fatigue. Negative for diaphoresis.   HENT:  Positive for congestion and sore throat. Negative for ear discharge, ear pain, hearing loss and tinnitus.    Eyes:  Negative for blurred vision, double vision, photophobia, pain, discharge and redness.   Respiratory:  Positive for cough and sputum production. Negative for hemoptysis, shortness of breath, wheezing and stridor.    Cardiovascular:  Negative for chest pain, palpitations and leg swelling.   Gastrointestinal:  Negative for abdominal pain, diarrhea, heartburn, nausea and vomiting.   Genitourinary:  Negative for dysuria.   Musculoskeletal:  Positive for myalgias.   Skin:  Negative for rash.   Neurological:  Positive for headaches. Negative for dizziness and focal weakness.   Endo/Heme/Allergies:  Does not bruise/bleed easily.   Psychiatric/Behavioral:  Negative for depression.    All other  "systems reviewed and are negative.      Medications: albuterol Aers  Blisovi 24 Fe Tabs  hydrOXYzine HCl Tabs  montelukast Tabs  valacyclovir Tabs     Allergies: Grass pollen(k-o-r-t-swt whitley) and Sulfa drugs    Problem List: does not have any pertinent problems on file.    Surgical History:  Past Surgical History:   Procedure Laterality Date    PRIMARY C SECTION  2012    APPENDECTOMY  1996       Past Social Hx:   reports that she has never smoked. She has never used smokeless tobacco. She reports that she does not drink alcohol and does not use drugs.     Past Family Hx:   family history includes Anemia in an other family member; Asthma in her paternal grandmother; Cancer in her maternal grandfather; Depression in her maternal grandmother; Diabetes in her maternal grandmother and mother; Hyperlipidemia in her father; Hypertension in her maternal grandmother and mother; Other in her paternal uncle.     Problem list, medications, and allergies reviewed by myself today in Epic.   I have documented what I find to be significant in regards to past medical, social, family and surgical history  in my HPI or under PMH/PSH/FH review section, otherwise it is noncontributory     Objective:     /70 (BP Location: Left arm, Patient Position: Sitting, BP Cuff Size: Large adult)   Pulse (!) 105   Temp 36.2 °C (97.1 °F)   Resp 16   Ht 1.727 m (5' 8\")   Wt 103 kg (226 lb)   SpO2 95%   BMI 34.36 kg/m²     During this visit, appropriate PPE was worn, and hand hygiene was performed.    Physical Exam  Vitals reviewed.   Constitutional:       General: She is not in acute distress.     Appearance: Normal appearance. She is not toxic-appearing or diaphoretic.   HENT:      Head: Normocephalic and atraumatic.      Right Ear: Tympanic membrane, ear canal and external ear normal. There is no impacted cerumen.      Left Ear: Tympanic membrane, ear canal and external ear normal. There is no impacted cerumen.      Nose: Congestion " and rhinorrhea present.      Mouth/Throat:      Mouth: Mucous membranes are moist.      Pharynx: Posterior oropharyngeal erythema present. No oropharyngeal exudate.      Comments: Tonsillar swelling 3+ with exudates and erythema present bilaterally. There is posterior oropharyngeal erythema present, no exudates or cobblestoning.  No soft tissue swelling of the sublingual mucosa, no petechia or swelling of the soft or hard palate, no unilarteral oropharynx swelling, no sign of tonsillar stone, epiglottitis, or abscess.  Airway is patent and there is no stridor.  Patient is managing oral secretions appropriately.  Uvula is midline and appropriate size with no erythema or edema.    Eyes:      General: No scleral icterus.        Right eye: No discharge.         Left eye: No discharge.      Extraocular Movements: Extraocular movements intact.      Conjunctiva/sclera: Conjunctivae normal.      Pupils: Pupils are equal, round, and reactive to light.   Cardiovascular:      Rate and Rhythm: Normal rate and regular rhythm.      Heart sounds: Normal heart sounds. No murmur heard.     No friction rub. No gallop.   Pulmonary:      Effort: No respiratory distress.      Breath sounds: Normal breath sounds. No stridor. No wheezing, rhonchi or rales.   Chest:      Chest wall: No tenderness.   Abdominal:      General: Bowel sounds are normal. There is no distension.      Palpations: Abdomen is soft.   Genitourinary:     Comments: Deferred  Musculoskeletal:         General: Normal range of motion.      Cervical back: Normal range of motion. No rigidity or tenderness.   Lymphadenopathy:      Cervical: No cervical adenopathy.   Skin:     General: Skin is warm and dry.      Capillary Refill: Capillary refill takes 2 to 3 seconds.      Coloration: Skin is not jaundiced or pale.   Neurological:      General: No focal deficit present.      Mental Status: She is alert and oriented to person, place, and time. Mental status is at baseline.    Psychiatric:         Mood and Affect: Mood normal.         Behavior: Behavior normal.         Thought Content: Thought content normal.         Judgment: Judgment normal.         Lab Results/POC Test Results   Results for orders placed or performed in visit on 12/22/24   POCT CEPHEID GROUP A STREP - PCR    Collection Time: 12/22/24 12:15 PM   Result Value Ref Range    POC Group A Strep, PCR Detected (A) Not Detected, Invalid   POCT CoV-2, Flu A/B, RSV by PCR    Collection Time: 12/22/24 12:41 PM   Result Value Ref Range    SARS-CoV-2 by PCR Negative Negative, Invalid    Influenza virus A RNA Negative Negative, Invalid    Influenza virus B, PCR Negative Negative, Invalid    RSV, PCR Negative Negative, Invalid           Assessment/Plan:     Diagnosis and associated orders:     1. Pharyngitis due to Streptococcus species  POCT CEPHEID GROUP A STREP - PCR    POCT CoV-2, Flu A/B, RSV by PCR    dexamethasone (Decadron) injection (check route below) 10 mg    amoxicillin (AMOXIL) 500 MG Cap      2. Acute cough  POCT CoV-2, Flu A/B, RSV by PCR    dexamethasone (Decadron) injection (check route below) 10 mg      3. Flu-like symptoms  POCT CEPHEID GROUP A STREP - PCR    POCT CoV-2, Flu A/B, RSV by PCR    dexamethasone (Decadron) injection (check route below) 10 mg         Comments/MDM:     1. Acute cough  - POCT CoV-2, Flu A/B, RSV by PCR  - dexamethasone (Decadron) injection (check route below) 10 mg    2. Pharyngitis due to Streptococcus species (Primary)   Strep PCR is positive.  I did notify the patient and discuss strep precautions-   -no sharing of drinks of water bottles, change toothbrush and pillowcases after 48 hours of antibiotics  - generally after 48 hours of antibiotics no longer infectious, may return to work.   -I considered other causes of pharyngitis peritonsillar abscess, zuleika's angina, and retropharyngeal abscess but the patient's reported symptoms and my exam do not support these alternative diagnosis  based on information I have available today.   I did offer patient a dose of Decadron in clinic today to help relieve inflamed throat tissue, instructed them regarding purpose, side effects, precautions (including its potential to make birth control less effective or fail if applicable).  They state they understand, and want to go ahead with the dose.  I did keep them in clinic for 10 minutes after the dose, they tolerated well with no adverse reactions before discharge. I did instruct the patient should not have any ibuprofen or any other NSAIDs for the next couple of days due to the Decadron dose, may take Tylenol as discussed if no contraindication.  -May use Flonase nasal spray to help with the congestion.    -I also advised them to use a humidifier in their room at night to help with sleep, and to gargle with salt water as well as a spoonful of honey as needed to help soothe the sore throat.    - I did print out written information regarding strep pharyngitis and all medications prescribed, and I did go over these with the patient.   -Discussed red flags and reasons to return to urgent care versus when to go to the emergency room  -They state they understand all instructions and and are agreeable with the plan of care.    - POCT CEPHEID GROUP A STREP - PCR  - POCT CoV-2, Flu A/B, RSV by PCR  - dexamethasone (Decadron) injection (check route below) 10 mg  - amoxicillin (AMOXIL) 500 MG Cap; Take 1 Capsule by mouth 2 times a day for 10 days.  Dispense: 20 Capsule; Refill: 0    3. Flu-like symptoms  - POCT CEPHEID GROUP A STREP - PCR  - POCT CoV-2, Flu A/B, RSV by PCR  - dexamethasone (Decadron) injection (check route below) 10 mg         Pt is clinically stable at today's acute urgent care visit. Vital signs are normal and reassuring.  No acute distress noted. Appropriate for outpatient management at this time.        I personally reviewed prior external notes and test results pertinent to today's visit.  I have  independently reviewed and interpreted all diagnostics ordered during this urgent care acute visit.        Please note that this dictation was created using voice recognition software. I have made a reasonable attempt to correct obvious errors, but I expect that there are errors of grammar and possibly content that I did not discover before finalizing the note.    This note was electronically signed by John PAZ, SREEDHAR, NAYE, DYAN